# Patient Record
Sex: MALE | Race: WHITE | NOT HISPANIC OR LATINO | Employment: UNEMPLOYED | URBAN - METROPOLITAN AREA
[De-identification: names, ages, dates, MRNs, and addresses within clinical notes are randomized per-mention and may not be internally consistent; named-entity substitution may affect disease eponyms.]

---

## 2019-12-02 ENCOUNTER — TRANSFERRED RECORDS (OUTPATIENT)
Dept: HEALTH INFORMATION MANAGEMENT | Facility: CLINIC | Age: 39
End: 2019-12-02

## 2021-06-17 ENCOUNTER — TRANSFERRED RECORDS (OUTPATIENT)
Dept: HEALTH INFORMATION MANAGEMENT | Facility: CLINIC | Age: 41
End: 2021-06-17

## 2021-06-18 ENCOUNTER — MEDICAL CORRESPONDENCE (OUTPATIENT)
Dept: HEALTH INFORMATION MANAGEMENT | Facility: CLINIC | Age: 41
End: 2021-06-18

## 2021-06-22 ENCOUNTER — TELEPHONE (OUTPATIENT)
Dept: OTOLARYNGOLOGY | Facility: CLINIC | Age: 41
End: 2021-06-22
Payer: MEDICARE

## 2021-06-22 ENCOUNTER — TRANSCRIBE ORDERS (OUTPATIENT)
Dept: OTHER | Age: 41
End: 2021-06-22

## 2021-06-22 DIAGNOSIS — R06.83 SNORING: ICD-10-CM

## 2021-06-22 DIAGNOSIS — G47.33 OSA (OBSTRUCTIVE SLEEP APNEA): ICD-10-CM

## 2021-06-22 DIAGNOSIS — R53.83 FATIGUE: Primary | ICD-10-CM

## 2021-06-22 NOTE — TELEPHONE ENCOUNTER
[facility] Call [facility]    Phone Message    May a detailed message be left on voicemail: no     Reason for Call: Appointment Intake    Referring Provider Name: Dr. Everardo Warren Jr at [facility] to Dr. Dolly Dominguez re: possible Inspire to assist with management of moderate to severe RICHY.  Diagnosis and/or Symptoms: Fatigue [R53.83] RICHY (obstructive sleep apnea) [G47.33] Snoring [R06.83    Action Taken: Message routed to:  [facility]): CLINIC COORDINATORS-EYE-DENTAL-ENT- [[id number]] - per protocols    Travel Screening: Not Applicable

## 2021-06-22 NOTE — TELEPHONE ENCOUNTER
FUTURE VISIT INFORMATION      FUTURE VISIT INFORMATION:    Date: 7/23/2021    Time: 1PM    Location: [facility]  REFERRAL INFORMATION:    Referring provider:  Dr. Everardo Warren    Referring providers clinic: [facility]     Reason for visit/diagnosis  Inspire Device consult, referred by Everardo Warren at [facility], Sleep Study done 12/02/19, AHI 18.9    RECORDS REQUESTED FROM:       Clinic name Comments Records Status Imaging Status   [facility]  6/18/2021 referral   6/17/2021 note from Dr. Everardo Warren  12/2/2019 sleep study  Scanned in EPIC

## 2021-07-17 ENCOUNTER — HOSPITAL ENCOUNTER (OUTPATIENT)
Facility: CLINIC | Age: 41
Setting detail: OBSERVATION
Discharge: HOME | End: 2021-07-18
Attending: EMERGENCY MEDICINE | Admitting: EMERGENCY MEDICINE
Payer: MEDICARE

## 2021-07-17 DIAGNOSIS — R45.851 SUICIDAL IDEATION: ICD-10-CM

## 2021-07-17 DIAGNOSIS — F32.A DEPRESSION, UNSPECIFIED DEPRESSION TYPE: ICD-10-CM

## 2021-07-17 LAB — SARS-COV-2 RNA RESP QL NAA+PROBE: NEGATIVE

## 2021-07-17 PROCEDURE — G0378 HOSPITAL OBSERVATION PER HR: HCPCS

## 2021-07-17 PROCEDURE — 87635 SARS-COV-2 COVID-19 AMP PRB: CPT | Performed by: EMERGENCY MEDICINE

## 2021-07-17 PROCEDURE — C9803 HOPD COVID-19 SPEC COLLECT: HCPCS

## 2021-07-17 PROCEDURE — 90791 PSYCH DIAGNOSTIC EVALUATION: CPT

## 2021-07-17 PROCEDURE — 99285 EMERGENCY DEPT VISIT HI MDM: CPT | Mod: 25

## 2021-07-17 RX ORDER — ERGOCALCIFEROL 1.25 MG/1
50000 CAPSULE ORAL WEEKLY
COMMUNITY

## 2021-07-17 RX ORDER — NORTRIPTYLINE HCL 25 MG
25 CAPSULE ORAL AT BEDTIME
COMMUNITY

## 2021-07-17 RX ORDER — DEXMETHYLPHENIDATE HYDROCHLORIDE 10 MG/1
10 TABLET ORAL DAILY
COMMUNITY
End: 2021-12-25

## 2021-07-17 RX ORDER — ROSUVASTATIN CALCIUM 5 MG/1
5 TABLET, COATED ORAL AT BEDTIME
COMMUNITY

## 2021-07-17 RX ORDER — SUMATRIPTAN 100 MG/1
100 TABLET, FILM COATED ORAL
COMMUNITY

## 2021-07-17 ASSESSMENT — ACTIVITIES OF DAILY LIVING (ADL): DRESS: STREET CLOTHES

## 2021-07-17 ASSESSMENT — ENCOUNTER SYMPTOMS: NAUSEA: 1

## 2021-07-17 ASSESSMENT — MIFFLIN-ST. JEOR
SCORE: 1779.89
SCORE: 1779.38

## 2021-07-18 VITALS
WEIGHT: 194.89 LBS | DIASTOLIC BLOOD PRESSURE: 78 MMHG | BODY MASS INDEX: 28.87 KG/M2 | HEIGHT: 69 IN | SYSTOLIC BLOOD PRESSURE: 126 MMHG | OXYGEN SATURATION: 96 % | RESPIRATION RATE: 18 BRPM | HEART RATE: 61 BPM | TEMPERATURE: 98.1 F

## 2021-07-18 PROCEDURE — G0378 HOSPITAL OBSERVATION PER HR: HCPCS

## 2021-07-18 PROCEDURE — 99236 HOSP IP/OBS SAME DATE HI 85: CPT | Performed by: NURSE PRACTITIONER

## 2021-07-18 RX ORDER — ONDANSETRON 8 MG/1
8 TABLET, FILM COATED ORAL EVERY 8 HOURS PRN
Qty: 20 TABLET | Refills: 0 | Status: SHIPPED | OUTPATIENT
Start: 2021-07-18 | End: 2022-06-07

## 2021-07-18 NOTE — ED NOTES
Pt this morning is alert and oriented x 3, VSS and he met with the LMHP. Pt is calm and cooperative but does not want to go inpatient denies current SI.

## 2021-07-18 NOTE — PROGRESS NOTES
Patient agreed to discharge plan. Discharge instructions reviewed with patient including follow-up care plan. Medications: Zofran ordered to be picked up at patients Pharm[facility]. Reviewed safety plan and outpatient resources. Denies SI and HI. All belongings that were brought into the hospital have been returned to patient. Escorted off the unit at door 4 accompanied by [facility] staff. Discharged to home/selfcare via private transport by significant other.

## 2021-07-18 NOTE — ED NOTES
Writer jakob'd call from [facility]  reporting that they are sending the PT to the ED for further assessment.  She reports that this [place].  She states that he managed to bring himself safely home and CLYDE was contacted this afternoon.  He reported to her that his suicidal thoughts have reduced but are typically chronic present.  She reports that he has been having an increase in depression. She states that he has a therapist and psychiatrist but has been off of his medications for at least 6 months due to vomiting side effects.  He is supposed to be on Abilify and Trintilex.  She reports at least 5 previous hospitalizations with recommendations of ECT.  He has a hx of cd treatment with Coke and Meth use.  She states that he only reported chronic THC use.  She is having his girlfriend transported to the ED for further assessment and possible [facility] services.

## 2021-07-18 NOTE — PROGRESS NOTES
40 year old male with history of depression and anxiety received from ED due to suicidal ideation with plan [place] earlier today. Reports that he has chronic SI but it has been worth the last month with increased psychosocial and relationship stressors. Pt reports he has not taken his medications in 4-8 weeks. Endorses SI. Nursing and risk assessments completed. Assessments reviewed with LMHP and physician. Video monitoring in progress, patient informed.  Admission information reviewed with patient. Patient given a tour of [facility] and instructions on using the facility. Questions regarding [facility] addressed. Pt search completed and belongings inventoried.

## 2021-07-18 NOTE — DISCHARGE INSTRUCTIONS
[facility] Interventional Psychiatry Program    Website link:    [email or web address]      Telehealth: [facility] has video and phone visit options available  in addition to in-person visits. Please call them at [phone] and to schedule a visit. If it s possible to see your provider through a video visit, they will guide you through that process.  For many patients suffering from major depression, traditional medication or talk therapy is not effective in relieving symptoms. [facility]ventional Psychiat[facility] offers several procedures to help patients with treatmentresistant mood disorders.     If I am feeling unsafe or I am in a crisis, I will:  Contact my established care providers  Call the National Suicide Prevention Lifeline: [phone]  Go to the nearest emergency room  Call [phone]    Warning signs that I or other people might notice when a crisis is developing for me: increased suicidal thoughts, intent to act on the suicidal thoughts   Things I am able to do on my own to cope or help me feel better: meditation, hiking  Things that I am able to do with others to cope or help me better: try to stay connected to community and keep working on the great community projects that you have going  Things I can use or do for distraction: meditation, hiking  Changes I can make to support my mental health and wellness: start taking medications again, try to get connected the with alternative therapies program at the [facility], consider taking a MBSR class at the [facility] or [facility], consider going to SMART recovery meetings  People in my life that I can ask for help: my girlfriend, friends around the country  Your [facility] has a mental health crisis team you can call 24/7: [facility] Crisis Line Number: [phone]  Other things that are important when I m in crisis: try  to remember that you are working hard to improve yourself and your community. It's a long, difficult road, but things can change.   Additional resources and information:   -information printed out for you about MBSR classes and N interventional psychiatry program     Follow-Up Plans and Providers:   -Psychiatry med management appt scheduled for 8/10 with Mahnaz Gold  -[facility] will try to schedule a sooner med management appt for you and will reach out with info   -Follow up with Susan Adkins for therapy

## 2021-07-18 NOTE — ED PROVIDER NOTES
"  History   Chief Complaint:  Suicidal     The history is provided by the patient.      Srinivas Young is a 40 year old male with history of suicidal ideation, bipolar II disorder, depression, HTN and hyperlipidemia who presents with suicidal ideation. Srinivas reports that he has been inconsistently taking his medications for the last few months and in the last 6 weeks, he has completely stopped talking them due to nausea. He is prescribed all the medications listed below, but is currently taking none of them. In the last 6 weeks, he has had constant suicidal thoughts due to not taking his medications as well as stressors such as relationship issues. He has not acted on harming himself. He notes he is currently looking for a psychiatrist and has an upcoming appointment on August 10th, but is seeking help sooner than that.     Review of Systems   Gastrointestinal: Positive for nausea.   Psychiatric/Behavioral: Positive for suicidal ideas. Negative for self-injury.   All other systems reviewed and are negative.    Allergies:  No Known Allergies    Medications:  Abilify  Focalin  Monodox  Pamelor  Prilosec  Crestor  Imitrex  Zanaflex  Detrol LA  Calan-SR  Trintellix    Past Medical History:    Depression  Suicidal ideation  Bipolar II disorder   Substance abuse    Family History:    Depression    Social History:  Presents alone  Patient smokes marijuana     Physical Exam     Patient Vitals for the past 24 hrs:   BP Temp Temp src Pulse Resp SpO2 Height Weight   07/17/21 2141 132/89 98.9  F (37.2  C) Oral 75 18 95 % 1.753 m (5' 9\") 88.4 kg (194 lb 14.2 oz)   07/17/21 1947 (!) 147/90 (!) 96.5  F (35.8  C) Temporal 90 18 96 % 1.753 m (5' 9\") 88.5 kg (195 lb)     Physical Exam  General: Well-nourished,appears to be resting comfortably when I enter the room  Eyes: PERRL, conjunctivae pink no scleral icterus or conjunctival injection  ENT:  Moist mucus membranes, posterior oropharynx clear without erythema or " exudates  Respiratory:  Lungs clear to auscultation bilaterally, no crackles/rubs/wheezes.  Good air movement  CV: Normal rate and rhythm, no murmurs/rubs/gallops  GI:  Abdomen soft and non-distended.  Normoactive BS.  No tenderness, guarding or rebound  Skin: Warm, dry.  No rashes or petechiae  Musculoskeletal: No peripheral edema or calf tenderness  Neuro: Alert and oriented to person/place/time  Physical appearance, attire: Appears stated age. Hygiene well groomed. Eye contact at examiner. Speech regular, speech volume regular, speech quality fluid. Cognitive, perceptual reality based. Cognition, memory intact, judgment intact, insight intact. Orientation to time, place, person and situation. Thought logical. Hallucinations: None. General behavioral tone: Cooperative. Psychomotor activity: No problem noted. Gait: No problem. Mood depressed. Affect flat.    Emergency Department Course   Laboratory:  Asymptomatic Covid: Negative    Emergency Department Course:    Reviewed:  I reviewed nursing notes, vitals, past medical history and care everywhere    Assessments:  2000 I obtained history and examined the patient as noted above.     Disposition:  The patient was transferred to [facility].     Impression & Plan     Medical Decision Making:  Srinivas Young is a 40 year old male who presents for evaluation of worsening depression with suicidal ideation.  There are no concerns for or signs at this point of suicide attempt or ingestion, no concerning findings on the remainder of physical exam. The patient is medically cleared and deemed a good candidate for [facility] for further psychiatric evaluation and care. They were in agreement and are transferred to the [facility] unit in stable condition.    Covid-19  Srinivas Young was evaluated during a global COVID-19 pandemic, which necessitated consideration that the patient might be at risk for infection with the SARS-CoV-2 virus that causes COVID-19.   Applicable protocols for evaluation  were followed during the patient's care.   COVID-19 was considered as part of the patient's evaluation. The plan for testing is:  a test was obtained during this visit.    Diagnosis:    ICD-10-CM    1. Suicidal ideation  R45.851    2. Depression, unspecified depression type  F32.9      Scribe Disclosure:  Ronni ARREDONDO, am serving as a scribe at 7:54 PM on 7/17/2021 to document services personally performed by Jessica Elizondo MD based on my observations and the provider's statements to me.        Jessica Elizondo MD  07/18/21 0230

## 2021-07-18 NOTE — CONSULTS
"7/17/2021  Srinivas Young 1980     [facility] Mental Health Assessment:    Started at: 8:00am   Completed at: 8:35am  What type of assessment are you doing today? Crisis assessment    1.  Presenting Problem:      Referral Method to ED? Family/Friends     What brings the patient to the ED today? Srinivas is 40 year old male and uses he/him pronouns. Srinivas presented to [facility] for worsening depression with chronic SI. Srinivas stated that he has not taken MH medications for 4-6 weeks due to feeling that they weren't working and were making him nauseous. Per collateral report from [place], collected by Vonnie [facility], \"Writer rec'd call from [facility]  reporting that they are sending the PT to the ED for further assessment.  She reports that this AM the [place].  She states that he managed to bring himself safely home and [facility] was contacted this afternoon.\"   Srinivas reports that he feels fatigued and hopeless with poor appetite and is worried about how his MH is impacting his relationship with his girlfriend. Srinivas reported a hx of 5 psych hospitalizations in the past, and ECT has been recommended to him. Srinivas reported that he is open to learning more about ECT and is interested in alternative treatments at the [facility]AL [facility].   Srinivas reports issues with heartburn, sleep apnea and chronic migraines. Srinivas reported that his sleep is okay other than the severe sleep apnea (uses a cpap machine).   Srinivas reported a hx of coke and meth use, no recent use. He stated that he had been using cannabis daily, but quit 2 weeks ago. Srinivas reported that he is interested in starting SMART Recovery meetings (as an alternative to AA).   Srinivas reported chronic SI for about the last 6 weeks, but no hx of attempts, other than contemplating [place] 7/17. Srinivas does not have access to firearms. When asked how Srinivas has coped " and managed his chronic SI, Srinivas spoke extensively about the community-based projects and work that he is involved in. Srinivas is looking forward to continuing to work on these projects. Srinivas also spoke about his young daughter and wanting to be able to provide for her.   Srinivas reported that if SI increases in intensity again he is able to reach out to his girlfriend, call [facility] or 911, or bring himself to the ED.     Has this happened before? Yes depression for decades, hx of 5 hospitalizations     Duration of presenting problem: managing depression for decades, not taking medications for the last 4-6 weeks    Additional Stressors: n/a    2.  Risk Assessment:  Suicide and Self-Harm    ESS-6  1.a. Over the past 2 weeks, have you had thoughts of killing yourself? Yes   1.b. Have you ever attempted to kill yourself and, if yes, when did this last happen? No  2. Recent or current suicide plan? Yes  3. Recent or current intent to act on ideation? No  4. Lifetime psychiatric hospitalization? Yes  5. Pattern of excessive substance use? No  6. Current irritability, agitation, or aggression? No  ESS-6 Score: 3    SI: Active  Plan: Yes yesterday morning was standing at [place] contemplating jumping off. Did not attempt to jump off.   Intent: No   Prior Attempts: No     Protective Factors: obligation to family, particularly young daughter, future-focused thinking, no access to firearms. Srinivas spoke extensively of the community work that he is doing and future projects that he is looking forward to.     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 Going to the gym, spending time in the sun, spending time with his daughter. Srinivas reported that he has not taken any MH meds in 4-6 weeks due to them not working well.     Additional Risk Factors  Related to Safety and Suicide: No    Is the patient engaged in self injurious behaviors? No     Risk to Others    Aggressive/Assaultive/Homicidal Risk Factors: No     Duty to Warn? No     Was a Child Protection Report Made? No       Was a Adult Protection Report Made? No        Sexually inappropriate behavior? No        Vulnerability to sexual exploitation? No     Additional information: n/a      3. Mental Health Symptoms and Substance Use  Current Symptoms and Mental Health History    GAIN Short Screener (GAIN-SS) administered? NA    Attention, Hyperactivity, and Impulsivity Symptoms      Patient reported symptoms related to hyperactivity, inattention, or impulsivity? No     Anxiety Symptoms    Patient reported anxiety symptoms? No       Behavioral Difficulties    Patient reported behavioral difficulties? No     Mood Symptoms    Patient reported mood disorder symptoms? Yes: Appetite change/weight change , Feelings of helplessness , Feelings of hopelessness , Feelings of worthlessness , Loss of interest / Anhedonia , Sad, depressed mood  and Thoughts of suicide/death      Eating Disorders and Appetite Disturbance      Patient reported appetite symptoms? No     Interpersonal Functioning     Patient reported difficulties that may be associated with personality and interpersonal functioning? No    Learning Disabilities/Cognitive/Developmental Disorders    Patient reported concerns related to learning disabilities, cognitive challenges, and/or developmental disorders? No     General Cognitive Impairments    Patient reported symptoms of cognitive impairments? No  If yes, complete Mini-Cog Assessment.    Sleep Disturbance    Patient reported difficulties with sleep? Yes: Other: Srinivas reported severe sleep apnea, but otherwise sleep is okay.       Psychosis Symptoms    Patient reported symptoms of psychosis? No      Trauma and Post-Traumatic Stress Disorder    Physical Abuse: No   Emotional/Psychological Abuse: No  Sexual  Abuse: No  Loss of a friend or family member to suicide: No  Other Traumatic Event: No     Patient reported trauma related symptoms? No     Impact of Mental Health on Functioning      Negative Impact Score: 9/10   Subjective Impact on functioning: MH impacting his relationship, feels fatigued and that meds are not helping  How do symptoms vary from baseline? Worsening depression     Current and Historical Substance Use Note:    IIs there a history of, or current, substance use? Yes, hx of cocaine and meth use - not currently using. Srinivas reported that he was regularly using cannabis, but quit 2 weeks ago.     Have you been to chemical dependency treatment or detox before? Yes: hx of treatment. Srinivas reported that he is thinking about going to [facility] recovery meetings.      CAGE-AID    Have you felt you ought to cut down on your drinking or drug use? Yes     Have people annoyed you by criticizing your drinking or drug use? Yes   Have you felt bad or guilty about your drinking or drug use? Yes  Have you ever had a drink or used drugs first thing in the morning to steady your nerves or to get rid of a hangover? No   CAGE-AID Score: 3/4    Drug screen completed? No   BAL/Breathalyzer completed? No       Mental Status Exam:    Affect: Flat  Appearance: Appropriate   Attention Span/Concentration: Attentive    Eye Contact: Engaged  Fund of Knowledge: Appropriate   Language /Speech Content: Fluent  Language /Speech Volume: Normal   Language /Speech Rate/Productions: Normal   Recent Memory: Intact  Remote Memory: Intact  Mood: Depressed   Orientation:   Person: Yes   Place: Yes  Time of Day: Yes   Date: Yes   Situation (Do they understand why they are here?): Yes   Psychomotor Behavior: Normal   Thought Content: Clear  Thought Form: Intact    4. Social and Environmental Conditions   Is the patient their own guardian? Yes    Living Situation: With others: with girlfriend, 10 year old step-son, and infant daughter    Support  system and quality of connections: girlfriend Sierra is supportive, but there is relationship conflict. Friends out of state, minimal support network in [place]     Income source: Employment: community-based work    Issues with employment or education: No    Legal Concerns  Do you have any history of or current involvement with the legal system? No    Spiritual and Cultural Influences  Do you have any Orthodoxy beliefs that are important in your life? No     Do you have any cultural influences in your life that impact your mental health care? No        5. Psychiatric History, Medical History, and Current Care      Patient Mental Health Services   Does the patient have a history of mental health concerns/diagnoses? Yes depression     Current Providers  Primary Care Provider: Yes Jessy Rice at [facility]   Psychiatrist: No   Therapist: Yes Susan Adkins with [facility]   : No   [facility]: No   ACT Team: No   Other: No    History of Commitment? No  History of Psychiatric Hospitalizations? Yes 5 inpt stays in the past   History of programmatic care? No    Family Mental Health History   Family History of Mental Health or Chemical Dependency Issues? Unable to assess    Development and Physical Health Challenges  Delays or concerns meeting developmental milestones? No  Current psychotropic medications? No   Medication Compliant? NA - is currently prescribed meds, but has not taken them in 4-6 weeks  Recent medication changes? NA    History of concussion or TBI? No     Additional Information: n/a    6. Collateral Information and Collaboration    Collaboration with medical staff:Referral Information:   Medical Records, Psychiatry and Nursing     Collateral Information/Sources: Family: girlfriend, Sierra and Community Provider: CLYDE called [facility] to give report    7. Assessment and Diagnosis  Assessment of patient strengths and vulnerabilities    Strengths, Protective Factors, & Community Resources: actively sought  help from family and providers, connected with individual and couples therapists     Patient skills, abilities, and coping skills (what is going well?): Srinivas is well connected to and actively involved in his community    Patient vulnerabilities: treatment resistant depression, relationship stressors    Diagnosis (F32.9) depressive disorder, unspecified     8.Therapeutic Methodologies Utilized in Assessment    Psychotherapy techniques and/or interventions used: Establishing rapport, Active listening, Motivational Interviewing, Brief Supportive Therapy and Safety planning    9. Patient Care/Treatment Plan  Summary of Patient Presentation and needs  What are the basic needs for this patient in this moment? Medication consultation with attending provider, additional resources for therapies for depression     Consultations :  Attending provider consulted? Yes  Attending Name: JAZLYN Stroud, ETHEL   Attending concurs with disposition? Yes     Recommended disposition: Individual Therapy and Medication Management     Does the patient agree with the recommended level of care? Yes    Final disposition: Individual therapy  and Medication management    Disposition Details: Srinivas will follow up with Mahnaz Gold on 8/10 for med management. [facility] also requested an appt from [facility] to try get Srinivas access to a med management provider sooner. [facility] coordinator to follow up. Srinivas will continue with established therapist Susan Adkins at [facility]. Information was provided to Srinivas about how to access the [facility] interventional psychiatry program and mindfulness-based stress reduction classes.     If Inpatient, is patient admitted voluntary? N/A   Patient aware of potential for transfer if there is not appropriate placement? NA  Patient is willing to travel outside of the [facility] for placement? NA   Central Intake Notified? NA    10. Patient Care Document: Safety and After Care Planning:          Safety Plan Provided? Yes:   If I am feeling  unsafe or I am in a crisis, I will:  Contact my established care providers  Call the National Suicide Prevention Lifeline: [phone]  Go to the nearest emergency room  Call [phone]    Warning signs that I or other people might notice when a crisis is developing for me: increased suicidal thoughts, intent to act on the suicidal thoughts   Things I am able to do on my own to cope or help me feel better: meditation, hiking  Things that I am able to do with others to cope or help me better: try to stay connected to community and keep working on the great community projects that you have going  Things I can use or do for distraction: meditation, hiking  Changes I can make to support my mental health and wellness: start taking medications again, try to get connected the with alternative therapies program at the [facility], consider taking a MBSR class at the [facility] or [facility], consider going to SMART recovery meetings  People in my life that I can ask for help: my girlfriend, friends around the country  Your [facility] has a mental health crisis team you can call 24/7: [facility] Crisis Line Number: [phone]  Other things that are important when I m in crisis: try to remember that you are working hard to improve yourself and your community. It's a long, difficult road, but things can change.   Additional resources and information:   -information printed out for you about MBSR classes and [facility] interventional psychiatry program     Follow-Up Plans and Providers:   -Psychiatry med management appt scheduled for 8/10 with Mahnaz Gold  -[facility] will try to schedule a sooner med management appt for you and will reach out with info   -Follow up with Susan Adkins for therapy     Follow-Up Plan:  After care plan provided to the patient/guardian by: ARSALAN Paz  After care plan provided to any additional sources/parties? No    Duration of face to face time with patient in minutes: 1.0 hrs    CPT code(s)  utilized: 03729 - Psychotherapy for Crisis - 60 (30-74*) min      ARSALAN Paz

## 2021-07-18 NOTE — ED PROVIDER NOTES
[facility] Unit - Psychiatry  Combined Observation Note and Discharge Summary  [facility] Emergency Department  Observation Initiation Date: Jul 17, 2021    Srinivas Young MRN: [id number]   Age: 40 year old YOB: 1980     History     Chief Complaint   Patient presents with     Suicidal     HPI  Srinivas Young is a 40 year old male with a past history notable for bipolar II disorder, depression, HTN and hyperlipidemia who presents with suicidal ideation. Reports that he has been inconsistently taking his medications for the last few months and in the last 6 weeks, he has completely stopped talking them due to nausea and inefficacy. Of note, the nausea/gagging has not improved off medication.  Reports he is working with Mahnaz Gold CNP with [facility].  In addition, Reports he has an appointment within [facility] with psychiatry in September.  His provider has broached the subject of ECT as he has a long history of treatment resistant depression.  In the last 6 weeks, off the medication, Reports increase in depression and chronic suicidal ideation is more intense.  Endorses psychosocial stressors including relationship uihe has had constant suicidal thoughts due to not taking his medications as well as stressors such as relationship issues. He has not acted on harming himself. He notes he is currently looking for a psychiatrist and has an upcoming appointment on August 10th, but is seeking help sooner than that.     Past Medical History  Past Medical History:   Diagnosis Date     Depression      Prolonged Q-T interval on ECG      Past Surgical History:   Procedure Laterality Date     no surgical history       dexmethylphenidate (FOCALIN) 10 MG tablet  ergocalciferol (ERGOCALCIFEROL) 1.25 MG (79697 UT) capsule  nortriptyline (PAMELOR) 25 MG capsule  ondansetron (ZOFRAN) 8 MG tablet  riboflavin (VITAMIN  B2) 25 MG TABS  rosuvastatin (CRESTOR) 5 MG tablet  SUMAtriptan (IMITREX)  "100 MG tablet      No Known Allergies  Family History  Family History   Problem Relation Age of Onset     Depression Mother      Depression Father      Depression Paternal Grandmother      Arrhythmia Brother         prolonged QT     Arrhythmia Sister         prolonged QT     Social History   Social History     Tobacco Use     Smoking status: Never Smoker   Substance Use Topics     Alcohol use: Yes     Comment: daily- 1 or 2      Drug use: Yes     Types: Marijuana      Past medical history, past surgical history, medications, allergies, family history, and social history were reviewed with the patient. No additional pertinent items.       Review of Systems  A complete review of systems was performed with pertinent positives and negatives noted in the HPI, and all other systems negative.    Physical Examination   BP: (!) 147/90  Pulse: 90  Temp: (!) 96.5  F (35.8  C)  Resp: 18  Height: 175.3 cm (5' 9\")  Weight: 88.5 kg (195 lb)  SpO2: 96 %    Physical Exam  General: Appears stated age.   Neuro: Alert and fully oriented. Extremities appear to demonstrate normal strength on visual inspection.   Integumentary/Skin: no rash visualized, normal color    Psychiatric Examination   Appearance: awake, alert and dressed in hospital scrubs  Attitude:  cooperative  Eye Contact:  fair  Mood:  depressed  Affect:  appropriate and in normal range  Speech:  clear, coherent  Psychomotor Behavior:  no evidence of tardive dyskinesia, dystonia, or tics  Throught Process:  logical and goal oriented  Associations:  no loose associations  Thought Content:  chronic suicidal thoughts, no plan or intent  Insight:  fair  Judgement:  intact  Oriented to:  time, person, and place  Attention Span and Concentration:  intact  Recent and Remote Memory:  intact  Language: able to name/identify objects without impairment  Fund of Knowledge: intact with awareness of current and past events    ED Course        Labs Ordered and Resulted from Time of ED " Arrival Up to the Time of Departure from the ED   SARS-COV2 (COVID-19) VIRUS RT-PCR - Normal    Narrative:     Testing was performed using the patricia  SARS-CoV-2 & Influenza A/B Assay on the patricia  [place]  System.  This test should be ordered for the detection of SARS-COV-2 in individuals who meet SARS-CoV-2 clinical and/or epidemiological criteria. Test performance is unknown in asymptomatic patients.  This test is for in vitro diagnostic use under the FDA EUA for laboratories certified under CLIA to perform moderate and/or high complexity testing. This test has not been FDA cleared or approved.  A negative test does not rule out the presence of PCR inhibitors in the specimen or target RNA in concentration below the limit of detection for the assay. The possibility of a false negative should be considered if the patient's recent exposure or clinical presentation suggests COVID-19.  [facility] are certified under the Clinical Laboratory Improvement Amendments of 1988 (CLIA-88) as qualified to perform moderate and/or high complexity laboratory testing.   COVID-19 VIRUS (CORONAVIRUS) BY PCR    Narrative:     The following orders were created for panel order Asymptomatic COVID-19 Virus (Coronavirus) by PCR Nasopharyngeal.  Procedure                               Abnormality         Status                     ---------                               -----------         ------                     SARS-COV2 (COVID-19) Vir...[[id number]]  Normal              Final result                 Please view results for these tests on the individual orders.       Assessments & Plan (with Medical Decision Making)   Patient presenting with . Nursing notes reviewed noting no acute issues.     I have reviewed the assessment completed by the [facility].     During the observation period, the patient did not require medications for agitation, and did not require restraints/seclusion for patient and/or provider safety.    The  patient was found to have a psychiatric condition that would benefit from an observation stay in the emergency department for further psychiatric stabilization and/or coordination of a safe disposition. The observation plan includes serial assessments of psychiatric condition, potential administration of medications if indicated, further disposition pending the patient's psychiatric course during the monitoring period.     After the period in observation care, the patient's circumstances and mental state were safe for outpatient management. After counseling on the diagnosis, work-up, and treatment plan, the patient was discharged. Close follow-up with a psychiatrist and/or therapist was recommended and community psychiatric resources were provided. Patient is to return to the ED if any urgent or potentially life-threatening concerns.      At the time of discharge, the patient's acute suicide risk was determined to be low due to the following factors: Reduction in the intensity of mood/anxiety symptoms that preceded the admission, denial of suicidal thoughts, denies feeling helpless or helpless, not currently under the influence of alcohol or illicit substances, denies experiencing command hallucinations, no immediate access to firearms. The patient's acute risk could be higher if noncompliant with their treatment plan, medications, follow-up appointments or using illicit substances or alcohol.     Preliminary diagnosis:    ICD-10-CM    1. Suicidal ideation  R45.851    2. Depression, unspecified depression type  F32.9         Treatment Plan:   -Discharged to home,   -Restart medications (he has all at home) and will add Zofran targeting the nausea he is experiencing. Would recommend starting the Abilify at 5 mg for four days then increase to 10 mg (hsitorical dose).    Information provided on the [facility] Interventional Psychiatry Program    Website link:     [email or web address]    --  JAZLYN Pérez CNP   [facility] EMERGENCY DEPT  [facility] Unit  7/17/2021         Sandrine Abreu APRN CNP  07/18/21 [id number]

## 2021-07-18 NOTE — ED TRIAGE NOTES
Suicidal, depression, unable to take psych meds x6wks due to nausea/vomiting. Called [facility] line and advised to come to ED.

## 2021-07-18 NOTE — ED NOTES
Brought patient to [facility] from ED at 9:45 pm. Patient tired and declined assessment until morning.

## 2021-07-19 ENCOUNTER — TELEPHONE (OUTPATIENT)
Dept: PSYCHIATRY | Facility: CLINIC | Age: 41
End: 2021-07-19

## 2021-07-19 DIAGNOSIS — Z71.89 OTHER SPECIFIED COUNSELING: ICD-10-CM

## 2021-07-19 NOTE — TELEPHONE ENCOUNTER
New Refer to [facility], Referred from ED stay    Received Referral from Jessy Rice MD at [facility].    DX:  Anxiety          Mild Episode of recurrent major                  depressive disorder    Call patient to set up appointment  [phone]

## 2021-07-23 ENCOUNTER — VIRTUAL VISIT (OUTPATIENT)
Dept: OTOLARYNGOLOGY | Facility: CLINIC | Age: 41
End: 2021-07-23
Attending: PSYCHIATRY & NEUROLOGY
Payer: MEDICARE

## 2021-07-23 ENCOUNTER — PRE VISIT (OUTPATIENT)
Dept: OTOLARYNGOLOGY | Facility: CLINIC | Age: 41
End: 2021-07-23

## 2021-07-23 ENCOUNTER — MEDICAL CORRESPONDENCE (OUTPATIENT)
Dept: HEALTH INFORMATION MANAGEMENT | Facility: CLINIC | Age: 41
End: 2021-07-23

## 2021-07-23 VITALS — BODY MASS INDEX: 29.03 KG/M2 | HEIGHT: 69 IN | WEIGHT: 196 LBS

## 2021-07-23 DIAGNOSIS — G47.33 OSA (OBSTRUCTIVE SLEEP APNEA): Primary | ICD-10-CM

## 2021-07-23 PROCEDURE — 99442 PR PHYSICIAN TELEPHONE EVALUATION 11-20 MIN: CPT | Mod: 95 | Performed by: OTOLARYNGOLOGY

## 2021-07-23 RX ORDER — OXYMETAZOLINE HYDROCHLORIDE 0.05 G/100ML
2 SPRAY NASAL ONCE
Status: CANCELLED | OUTPATIENT
Start: 2021-07-23 | End: 2021-07-23

## 2021-07-23 RX ORDER — GLYCOPYRROLATE 0.2 MG/ML
0.2 INJECTION, SOLUTION INTRAMUSCULAR; INTRAVENOUS ONCE
Status: CANCELLED | OUTPATIENT
Start: 2021-07-23 | End: 2021-07-23

## 2021-07-23 ASSESSMENT — PAIN SCALES - GENERAL: PAINLEVEL: NO PAIN (0)

## 2021-07-23 ASSESSMENT — MIFFLIN-ST. JEOR: SCORE: 1784.43

## 2021-07-23 NOTE — PROGRESS NOTES
Srinivas is a 40 year old who is being evaluated via a billable telephone visit.      Phone call duration: 12 minutes        SLEEP SURGERY CONSULTATION    Patient: Srinivas Young  : 1980  CHIEF COMPLAINT: RICHY    IDENTIFICATION: Dr. Warren consulted Dr. Dominguez for surgical evaluation and possible treatment of obstructive sleep apnea syndrome for Srinivas Young.    HPI:  Srinivas Young is a 40 year old year old male who has Obstructive Sleep Apnea.  Patient has a sleep study performed on 2019 through the [facility] sleep center.  This was a split-night sleep study.  Overall AHI was 18.9.  Lowest oxygen saturation was 83%.  Nonsupine AHI was 1.7.  Supine AHI was 40.  Patient does like to sleep on his back.  He had 30 obstructive apneas, 12 hypopneas, 0 central apneas, 0 mixed apneas.  Patient has been using CPAP since his study.  He does use CPAP on a nightly basis and finds it to be helpful.  However he also finds CPAP to be extremely inconvenient as he does not like to go camping and traveling to places that do not have electricity where he cannot use his CPAP.  Those times where he is not able to use his CPAP he finds it to be very scary because he will wake up gasping for breath and even having nightmares about being choked.  He is interested in learning more about hypoglossal nerve stimulation therapy.  Patient's past medical history is significant for depression.    PAST MEDICAL HISTORY:  Past Medical History:   Diagnosis Date     Depression      Prolonged Q-T interval on ECG        PAST SURGICAL HISTORY:  Past Surgical History:   Procedure Laterality Date     no surgical history         MEDICATIONS:  Current Outpatient Medications   Medication Sig Dispense Refill     dexmethylphenidate (FOCALIN) 10 MG tablet Take 10 mg by mouth daily       ergocalciferol (ERGOCALCIFEROL) 1.25 MG (64370 UT) capsule Take 50,000 Units by mouth once a week       nortriptyline (PAMELOR) 25 MG capsule Take 25 mg by mouth At  Bedtime       ondansetron (ZOFRAN) 8 MG tablet Take 1 tablet (8 mg) by mouth every 8 hours as needed for nausea 20 tablet 0     riboflavin (VITAMIN  B2) 25 MG TABS Take 50 mg by mouth daily       rosuvastatin (CRESTOR) 5 MG tablet Take 5 mg by mouth daily       SUMAtriptan (IMITREX) 100 MG tablet Take 100 mg by mouth at onset of headache for migraine         ALLERGIES:  No Known Allergies    SOCIAL HISTORY:  Social History     Socioeconomic History     Marital status: Single     Spouse name: Not on file     Number of children: Not on file     Years of education: Not on file     Highest education level: Not on file   Occupational History     Not on file   Tobacco Use     Smoking status: Never Smoker   Substance and Sexual Activity     Alcohol use: Yes     Comment: daily- 1 or 2      Drug use: Yes     Types: Marijuana     Sexual activity: Not on file   Other Topics Concern     Not on file   Social History Narrative     Not on file     Social Determinants of Health     Financial Resource Strain:      Difficulty of Paying Living Expenses:    Food Insecurity:      Worried About Running Out of Food in the Last Year:      Ran Out of Food in the Last Year:    Transportation Needs:      Lack of Transportation (Medical):      Lack of Transportation (Non-Medical):    Physical Activity:      Days of Exercise per Week:      Minutes of Exercise per Session:    Stress:      Feeling of Stress :    Social Connections:      Frequency of Communication with Friends and Family:      Frequency of Social Gatherings with Friends and Family:      Attends [employer] Services:      Active Member of Clubs or Organizations:      Attends Club or Organization Meetings:      Marital Status:    Intimate Partner Violence:      Fear of Current or Ex-Partner:      Emotionally Abused:      Physically Abused:      Sexually Abused:        FAMILY HISTORY:  Family History   Problem Relation Age of Onset     Depression Mother      Depression Father       "Depression Paternal Grandmother      Arrhythmia Brother         prolonged QT     Arrhythmia Sister         prolonged QT       REVIEW OF SYSTEMS:  No flowsheet data found.      PHYSICAL EXAM  Ht 1.753 m (5' 9\")   Wt 88.9 kg (196 lb)   BMI 28.94 kg/m      ASSESSMENT:  1.  Moderate obstructive sleep apnea,        PLAN:  I discussed with the patient held upper airway stimulation therapy works. We reviewed expected outcomes as well as MRI incompatibility restrictions at this time.  We discussed what is involved in the surgery as well as as expected recovery.  Discussed with the patient the risk of nonresponse rate as well as potential discomfort from the device itself while stimulating the tongue. We then reviewed the selection criteria.    We will next proceed with DISE.  If he is not a candidate for Inspire, we will use the DISE to determine if he is candidate for other surgery      I spent a total of 30 minutes total time towards today's visit.  Time spent included seeing and evaluating Srinivas Young during today's office visit, which included counseling the patient.  Time was also spent reviewing records/tests;  and documenting clinical information in the electronic health record.            "

## 2021-07-23 NOTE — PROGRESS NOTES
Called patient and reviewed surgical teaching with patient and expectations. Answered questions where able. Patient advised that scheduling would call him next week to get him set up for surgery.    Patient was advised he would receive the surgical soap in the mail as well as a packet of information to review before surgery. Patient was advised if he has any questions to give our clinic a phone call.    Patient verbalized understanding of all instructions and is in agreement with the plan.    FABI GLYNN LPN on 7/23/2021 at 1:47 PM

## 2021-07-27 ENCOUNTER — TELEPHONE (OUTPATIENT)
Dept: OTOLARYNGOLOGY | Facility: CLINIC | Age: 41
End: 2021-07-27

## 2021-07-27 DIAGNOSIS — Z11.59 ENCOUNTER FOR SCREENING FOR OTHER VIRAL DISEASES: ICD-10-CM

## 2021-07-27 PROBLEM — G47.33 OSA (OBSTRUCTIVE SLEEP APNEA): Status: ACTIVE | Noted: 2021-07-27

## 2021-07-27 NOTE — TELEPHONE ENCOUNTER
Talked to patient but he was in a Zoom call he would like writer to call back after 2pm.     Zully Sousa on 7/27/2021 at 1:35 PM

## 2021-07-28 NOTE — TELEPHONE ENCOUNTER
Called patient back to schedule surgery    Surgeon: Dr. Dominguez  Date of Surgery: 08/25/2021  Location of surgery: [facility]  Pre-Op H&P: PCP   Post-Op Appt Date: 1 week    Imaging needed:  No  Discussed COVID-19 testing:  Yes  Pre-cert/Authorization completed:  No  Packet sent out: Yes 07/28/21    Discussed approximate arrival time. 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 Informed patient that writer is really not sure what Dr. Dominguez's OR time will be on 8/25 as things do change. Pt was provided number to contact for scheduling his covid-19 test or we will call him. No further questions.

## 2021-08-09 ENCOUNTER — VIRTUAL VISIT (OUTPATIENT)
Dept: PSYCHIATRY | Facility: CLINIC | Age: 41
End: 2021-08-09
Payer: MEDICARE

## 2021-08-09 DIAGNOSIS — F32.2 CURRENT SEVERE EPISODE OF MAJOR DEPRESSIVE DISORDER WITHOUT PSYCHOTIC FEATURES WITHOUT PRIOR EPISODE (H): Primary | ICD-10-CM
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
## 2021-08-24 ENCOUNTER — ANESTHESIA EVENT (OUTPATIENT)
Dept: SURGERY | Facility: AMBULATORY SURGERY CENTER | Age: 41
End: 2021-08-24
Payer: MEDICARE

## 2021-08-24 NOTE — ANESTHESIA PREPROCEDURE EVALUATION
Anesthesia Pre-Procedure Evaluation    Patient: Srinivas Young   MRN: [id number] : 1980        Preoperative Diagnosis: RICHY (obstructive sleep apnea) [G47.33]   Procedure : Procedure(s):  DRUG-INDUCED SLEEP ENDOSCOPY     Past Medical History:   Diagnosis Date     Depression      Prolonged Q-T interval on ECG       Past Surgical History:   Procedure Laterality Date     no surgical history        No Known Allergies   Social History     Tobacco Use     Smoking status: Never Smoker     Smokeless tobacco: Never Used   Substance Use Topics     Alcohol use: Yes     Comment: daily- 1 or 2       Wt Readings from Last 1 Encounters:   21 88.9 kg (196 lb)           Physical Exam    Airway        Mallampati: II   TM distance: > 3 FB   Neck ROM: full   Mouth opening: > 3 cm    Respiratory Devices and Support         Dental           Cardiovascular             Pulmonary                   OUTSIDE LABS:  CBC:   Lab Results   Component Value Date    WBC 9.6 10/21/2015    HGB 14.1 10/21/2015    HCT 42.2 10/21/2015     10/21/2015     BMP:   Lab Results   Component Value Date     10/21/2015    POTASSIUM 4.1 10/21/2015    CHLORIDE 107 10/21/2015    CO2 26 10/21/2015    BUN 14 10/21/2015    CR 0.98 10/21/2015    GLC 91 10/21/2015     COAGS: No results found for: PTT, INR, FIBR  POC: No results found for: BGM, HCG, HCGS  HEPATIC:   Lab Results   Component Value Date    ALBUMIN 3.8 10/21/2015    PROTTOTAL 6.9 10/21/2015     (H) 10/21/2015    AST 45 10/21/2015    ALKPHOS 88 10/21/2015    BILITOTAL 0.4 10/21/2015     OTHER:   Lab Results   Component Value Date    SHERYL 8.8 10/21/2015    TSH 2.72 10/21/2015       Anesthesia Plan    ASA Status:  2   NPO Status:  NPO Appropriate    Anesthesia Type: MAC.     - Reason for MAC: straight local not clinically adequate   Induction: Intravenous, Propofol.   Maintenance: TIVA.        Consents    Anesthesia Plan(s) and associated risks, benefits, and realistic alternatives  discussed. Questions answered and patient/representative(s) expressed understanding.     - Discussed with:  Patient      - Extended Intubation/Ventilatory Support Discussed: No.      - Patient is DNR/DNI Status: No    Use of blood products discussed: No .     Postoperative Care    Pain management: Multi-modal analgesia.   PONV prophylaxis: Ondansetron (or other 5HT-3)     Comments:    Patient has migraine. Ok with imitrex     H&P reviewed: Unable to attach H&P to encounter due to EHR limitations. H&P Update: appropriate H&P reviewed, patient examined. No interval changes since H&P (within 30 days).         Kin Schmidt MD

## 2021-08-25 ENCOUNTER — ANESTHESIA (OUTPATIENT)
Dept: SURGERY | Facility: AMBULATORY SURGERY CENTER | Age: 41
End: 2021-08-25
Payer: MEDICARE

## 2021-08-25 ENCOUNTER — HOSPITAL ENCOUNTER (OUTPATIENT)
Facility: AMBULATORY SURGERY CENTER | Age: 41
End: 2021-08-25
Attending: OTOLARYNGOLOGY
Payer: MEDICARE

## 2021-08-25 VITALS
TEMPERATURE: 97.9 F | OXYGEN SATURATION: 99 % | HEART RATE: 68 BPM | RESPIRATION RATE: 16 BRPM | WEIGHT: 196 LBS | HEIGHT: 69 IN | BODY MASS INDEX: 29.03 KG/M2 | DIASTOLIC BLOOD PRESSURE: 84 MMHG | SYSTOLIC BLOOD PRESSURE: 122 MMHG

## 2021-08-25 DIAGNOSIS — G47.33 OSA (OBSTRUCTIVE SLEEP APNEA): ICD-10-CM

## 2021-08-25 PROCEDURE — 31575 DIAGNOSTIC LARYNGOSCOPY: CPT

## 2021-08-25 PROCEDURE — 31575 DIAGNOSTIC LARYNGOSCOPY: CPT | Performed by: OTOLARYNGOLOGY

## 2021-08-25 RX ORDER — OXYCODONE HYDROCHLORIDE 5 MG/1
5-10 TABLET ORAL EVERY 4 HOURS PRN
Status: DISCONTINUED | OUTPATIENT
Start: 2021-08-25 | End: 2021-08-26 | Stop reason: HOSPADM

## 2021-08-25 RX ORDER — PROPOFOL 10 MG/ML
INJECTION, EMULSION INTRAVENOUS PRN
Status: DISCONTINUED | OUTPATIENT
Start: 2021-08-25 | End: 2021-08-25

## 2021-08-25 RX ORDER — HYDROMORPHONE HYDROCHLORIDE 1 MG/ML
0.2 INJECTION, SOLUTION INTRAMUSCULAR; INTRAVENOUS; SUBCUTANEOUS EVERY 5 MIN PRN
Status: DISCONTINUED | OUTPATIENT
Start: 2021-08-25 | End: 2021-08-25 | Stop reason: HOSPADM

## 2021-08-25 RX ORDER — ONDANSETRON 4 MG/1
4 TABLET, ORALLY DISINTEGRATING ORAL EVERY 30 MIN PRN
Status: DISCONTINUED | OUTPATIENT
Start: 2021-08-25 | End: 2021-08-26 | Stop reason: HOSPADM

## 2021-08-25 RX ORDER — PROPOFOL 10 MG/ML
INJECTION, EMULSION INTRAVENOUS CONTINUOUS PRN
Status: DISCONTINUED | OUTPATIENT
Start: 2021-08-25 | End: 2021-08-25

## 2021-08-25 RX ORDER — ARIPIPRAZOLE 10 MG/1
10 TABLET ORAL DAILY
COMMUNITY

## 2021-08-25 RX ORDER — LABETALOL HYDROCHLORIDE 5 MG/ML
10 INJECTION, SOLUTION INTRAVENOUS
Status: DISCONTINUED | OUTPATIENT
Start: 2021-08-25 | End: 2021-08-25 | Stop reason: HOSPADM

## 2021-08-25 RX ORDER — LIDOCAINE 40 MG/G
CREAM TOPICAL
Status: DISCONTINUED | OUTPATIENT
Start: 2021-08-25 | End: 2021-08-25 | Stop reason: HOSPADM

## 2021-08-25 RX ORDER — MEPERIDINE HYDROCHLORIDE 25 MG/ML
12.5 INJECTION INTRAMUSCULAR; INTRAVENOUS; SUBCUTANEOUS
Status: DISCONTINUED | OUTPATIENT
Start: 2021-08-25 | End: 2021-08-26 | Stop reason: HOSPADM

## 2021-08-25 RX ORDER — OMEPRAZOLE 40 MG/1
40 CAPSULE, DELAYED RELEASE ORAL DAILY
COMMUNITY
Start: 2021-07-12

## 2021-08-25 RX ORDER — VERAPAMIL HYDROCHLORIDE 120 MG/1
120 CAPSULE, EXTENDED RELEASE ORAL DAILY
COMMUNITY

## 2021-08-25 RX ORDER — OXYMETAZOLINE HYDROCHLORIDE 0.05 G/100ML
2 SPRAY NASAL ONCE
Status: COMPLETED | OUTPATIENT
Start: 2021-08-25 | End: 2021-08-25

## 2021-08-25 RX ORDER — MULTIVITAMIN WITH IRON
250 TABLET ORAL DAILY
COMMUNITY
Start: 2020-04-04

## 2021-08-25 RX ORDER — FENTANYL CITRATE 50 UG/ML
25 INJECTION, SOLUTION INTRAMUSCULAR; INTRAVENOUS EVERY 5 MIN PRN
Status: DISCONTINUED | OUTPATIENT
Start: 2021-08-25 | End: 2021-08-25 | Stop reason: HOSPADM

## 2021-08-25 RX ORDER — ALBUTEROL SULFATE 0.83 MG/ML
2.5 SOLUTION RESPIRATORY (INHALATION) EVERY 4 HOURS PRN
Status: DISCONTINUED | OUTPATIENT
Start: 2021-08-25 | End: 2021-08-25 | Stop reason: HOSPADM

## 2021-08-25 RX ORDER — SODIUM CHLORIDE, SODIUM LACTATE, POTASSIUM CHLORIDE, CALCIUM CHLORIDE 600; 310; 30; 20 MG/100ML; MG/100ML; MG/100ML; MG/100ML
INJECTION, SOLUTION INTRAVENOUS CONTINUOUS
Status: DISCONTINUED | OUTPATIENT
Start: 2021-08-25 | End: 2021-08-26 | Stop reason: HOSPADM

## 2021-08-25 RX ORDER — LIDOCAINE HYDROCHLORIDE 40 MG/ML
SOLUTION TOPICAL PRN
Status: DISCONTINUED | OUTPATIENT
Start: 2021-08-25 | End: 2021-08-25 | Stop reason: HOSPADM

## 2021-08-25 RX ORDER — ONDANSETRON 2 MG/ML
4 INJECTION INTRAMUSCULAR; INTRAVENOUS EVERY 30 MIN PRN
Status: DISCONTINUED | OUTPATIENT
Start: 2021-08-25 | End: 2021-08-26 | Stop reason: HOSPADM

## 2021-08-25 RX ORDER — KETOROLAC TROMETHAMINE 30 MG/ML
15 INJECTION, SOLUTION INTRAMUSCULAR; INTRAVENOUS EVERY 6 HOURS PRN
Status: DISCONTINUED | OUTPATIENT
Start: 2021-08-25 | End: 2021-08-26 | Stop reason: HOSPADM

## 2021-08-25 RX ORDER — LORAZEPAM 2 MG/ML
.5-1 INJECTION INTRAMUSCULAR
Status: DISCONTINUED | OUTPATIENT
Start: 2021-08-25 | End: 2021-08-25 | Stop reason: HOSPADM

## 2021-08-25 RX ORDER — OXYMETAZOLINE HYDROCHLORIDE 0.05 G/100ML
SPRAY NASAL PRN
Status: DISCONTINUED | OUTPATIENT
Start: 2021-08-25 | End: 2021-08-25 | Stop reason: HOSPADM

## 2021-08-25 RX ORDER — LIDOCAINE HYDROCHLORIDE 20 MG/ML
INJECTION, SOLUTION INFILTRATION; PERINEURAL PRN
Status: DISCONTINUED | OUTPATIENT
Start: 2021-08-25 | End: 2021-08-25

## 2021-08-25 RX ORDER — SODIUM CHLORIDE, SODIUM LACTATE, POTASSIUM CHLORIDE, CALCIUM CHLORIDE 600; 310; 30; 20 MG/100ML; MG/100ML; MG/100ML; MG/100ML
INJECTION, SOLUTION INTRAVENOUS CONTINUOUS
Status: DISCONTINUED | OUTPATIENT
Start: 2021-08-25 | End: 2021-08-25 | Stop reason: HOSPADM

## 2021-08-25 RX ORDER — ACETAMINOPHEN 325 MG/1
975 TABLET ORAL ONCE
Status: DISCONTINUED | OUTPATIENT
Start: 2021-08-25 | End: 2021-08-26 | Stop reason: HOSPADM

## 2021-08-25 RX ORDER — HYDRALAZINE HYDROCHLORIDE 20 MG/ML
2.5-5 INJECTION INTRAMUSCULAR; INTRAVENOUS EVERY 10 MIN PRN
Status: DISCONTINUED | OUTPATIENT
Start: 2021-08-25 | End: 2021-08-25 | Stop reason: HOSPADM

## 2021-08-25 RX ORDER — GLYCOPYRROLATE 0.2 MG/ML
0.2 INJECTION, SOLUTION INTRAMUSCULAR; INTRAVENOUS ONCE
Status: COMPLETED | OUTPATIENT
Start: 2021-08-25 | End: 2021-08-25

## 2021-08-25 RX ORDER — TIZANIDINE 2 MG/1
2 TABLET ORAL PRN
COMMUNITY
Start: 2020-10-14 | End: 2022-06-07

## 2021-08-25 RX ADMIN — PROPOFOL 50 MCG/KG/MIN: 10 INJECTION, EMULSION INTRAVENOUS at 07:18

## 2021-08-25 RX ADMIN — PROPOFOL 20 MG: 10 INJECTION, EMULSION INTRAVENOUS at 07:18

## 2021-08-25 RX ADMIN — GLYCOPYRROLATE 0.2 MG: 0.2 INJECTION, SOLUTION INTRAMUSCULAR; INTRAVENOUS at 06:43

## 2021-08-25 RX ADMIN — SODIUM CHLORIDE, SODIUM LACTATE, POTASSIUM CHLORIDE, CALCIUM CHLORIDE: 600; 310; 30; 20 INJECTION, SOLUTION INTRAVENOUS at 07:08

## 2021-08-25 RX ADMIN — OXYMETAZOLINE HYDROCHLORIDE 2 SPRAY: 0.05 SPRAY NASAL at 06:42

## 2021-08-25 RX ADMIN — LIDOCAINE HYDROCHLORIDE 50 MG: 20 INJECTION, SOLUTION INFILTRATION; PERINEURAL at 07:39

## 2021-08-25 ASSESSMENT — MIFFLIN-ST. JEOR: SCORE: 1784.43

## 2021-08-25 NOTE — OP NOTE
PREOPERATIVE DIAGNOSIS:   obstructive sleep apnea.        POSTOPERATIVE DIAGNOSIS:   obstructive sleep apnea.        PROCEDURE:  Drug-induced sleep endoscopy.        SURGEON:  Dolly Dominguez MD        ANESTHESIA:  Monitored anesthesia care.        SPECIMENS REMOVED:  None.        COMPLICATIONS:  None.        INDICATIONS:  Patient is a 40 year old male with moderate to severe obstructive sleep apnea, who has difficulty tolerating CPAP therapy.        FINDINGS:  V: Upper soft palate was mixed with open position and AP collapse           O: 100% lateral collapse           T : 75% AP collapse            E: passive        With head turn to the right, patient does have complete collapse of the right and open left.  With jaw thrust, good improvement at tongue base, lateral walls, and velum      DESCRIPTION OF PROCEDURE:  The patient was brought into the operating room, placed on the operating table in supine position.  A member of the Department of Anesthesia was present and supplied the monitored anesthesia care.  A timeout was taken to correctly identify the patient and the procedure.  Once the patient reached an appropriate level of sedation, an endoscope was introduced into the patient's left nostril.  The upper airway was observed.  Findings are as above.  The scope was removed.  The patient was handed back over to Anesthesia and transferred to the PACU in stable condition.

## 2021-08-25 NOTE — DISCHARGE INSTRUCTIONS
[facility]  Home Care Following Anesthesia  For 24 hours after surgery:  1. Get plenty of rest.  A responsible adult must stay with you for at least 24 hours after you leave the surgery center.  2. Do not drive or use heavy equipment.  If you have weakness or tingling, don't drive or use heavy equipment until this feeling goes away.   3. Do not drink alcohol.   4. Avoid strenuous or risky activities.  Ask for help when climbing stairs.  5. You may feel lightheaded.  IF so, sit for a few minutes before standing.  Have someone help you get up.   6. If you have nausea (feel sick to your stomach): Drink only clear liquids such as apple juice, ginger ale, broth or 7-Up.  Rest may also help.  Be sure to drink enough fluids.  Move to a regular diet as you feel able.   7. You may have a slight fever.  Call the doctor if your fever is over 100 F (37.7 C) (taken under the tongue) or lasts longer than 24 hours.  8. You may have a dry mouth, a sore throat, muscle aches or trouble sleeping. These should go away after 24 hours.  9. Do not make important or legal decisions.   10. It is recommended to avoid smoking.               Tips for taking pain medications  To get the best pain relief possible, remember these points:    Take pain medications as directed, before pain becomes severe.    Pain medication can upset your stomach: taking it with food may help.    Constipation is a common side effect of pain medication. Drink plenty of  fluids.    Eat foods high in fiber. Take a stool softener if recommended by your doctor or pharmacist.    Do not drink alcohol, drive or operate machinery while taking pain medications.    Ask about other ways to control pain, such as with heat, ice or relaxation.    Tylenol/Acetaminophen Consumption  To help encourage the safe use of acetaminophen, the makers of TYLENOL  have lowered the maximum daily dose for single-ingredient Extra Strength TYLENOL   (acetaminophen) products sold in the [place] from 8 pills per day (4,000 mg) to 6 pills per day (3,000 mg). The dosing interval has also changed from 2 pills every 4-6 hours to 2 pills every 6 hours.    If you feel your pain relief is insufficient, you may take Tylenol/Acetaminophen in addition to your narcotic pain medication.     Be careful not to exceed 3,000 mg of Tylenol/Acetaminophen in a 24 hour period from all sources.    If you are taking extra strength Tylenol/acetaminophen (500 mg), the maximum dose is 6 tablets in 24 hours.    If you are taking regular strength acetaminophen (325 mg), the maximum dose is 9 tablets in 24 hours.    Call a doctor for any of the followin. Signs of infection (fever, growing tenderness at the surgery site, a large amount of drainage or bleeding, severe pain, foul-smelling drainage, redness, swelling).  2. It has been over 8 to 10 hours since surgery and you are still not able to urinate (pass water).  3. Headache for over 24 hours.  4. Numbness, tingling or weakness the day after surgery (if you had spinal anesthesia).  5. Signs of Covid-19 infection (temperature over 100 degrees, shortness of breath, cough, loss of taste/smell, generalized body aches, persistent headache, chills, sore throat, nausea/vomiting/diarrhea)  Your doctor is:  Dr. Dolly Dominguez, ENT Otolaryngology: [phone]                    Or dial [phone] and ask for the resident on call for:  ENT Otolaryngology  For emergency care, call the:  [facility]:  [phone] (TTY for hearing impaired: [phone])

## 2021-08-25 NOTE — ANESTHESIA POSTPROCEDURE EVALUATION
Patient: Srinivas Young    Procedure(s):  DRUG-INDUCED SLEEP ENDOSCOPY    Diagnosis:RICHY (obstructive sleep apnea) [G47.33]  Diagnosis Additional Information: No value filed.    Anesthesia Type:  MAC    Note:  Disposition: Outpatient   Postop Pain Control: Uneventful            Sign Out: Well controlled pain   PONV:    Neuro/Psych: Uneventful            Sign Out: Acceptable/Baseline neuro status   Airway/Respiratory: Uneventful            Sign Out: Acceptable/Baseline resp. status   CV/Hemodynamics: Uneventful            Sign Out: Acceptable CV status; No obvious hypovolemia; No obvious fluid overload   Other NRE:    DID A NON-ROUTINE EVENT OCCUR?            Last vitals:  Vitals Value Taken Time   /84 08/25/21 0810   Temp 36.6  C (97.9  F) 08/25/21 0810   Pulse     Resp 16 08/25/21 0810   SpO2 99 % 08/25/21 0810       Electronically Signed By: Kin Schmidt MD  August 25, 2021  10:14 AM

## 2021-08-25 NOTE — ANESTHESIA CARE TRANSFER NOTE
Patient: Srinivas Young    Procedure(s):  DRUG-INDUCED SLEEP ENDOSCOPY    Diagnosis: RICHY (obstructive sleep apnea) [G47.33]  Diagnosis Additional Information: No value filed.    Anesthesia Type:   MAC     Note:    Oropharynx: oropharynx clear of all foreign objects and spontaneously breathing  Level of Consciousness: drowsy  Oxygen Supplementation: room air    Independent Airway: airway patency satisfactory and stable  Dentition: dentition unchanged  Vital Signs Stable: post-procedure vital signs reviewed and stable  Report to RN Given: handoff report given  Patient transferred to: Phase II    Handoff Report: Identifed the Patient, Identified the Reponsible Provider, Reviewed the pertinent medical history, Discussed the surgical course, Reviewed Intra-OP anesthesia mangement and issues during anesthesia, Set expectations for post-procedure period and Allowed opportunity for questions and acknowledgement of understanding      Vitals:  Vitals Value Taken Time   BP     Temp     Pulse     Resp     SpO2         Electronically Signed By: JAZLYN De Paz CRNA  August 25, 2021  7:40 AM

## 2021-08-25 NOTE — OR NURSING
Pt took his home med immetrex (sumatriptan) 6mg injection  in pre-op.  BHAVYA Schmidt and Attending surgeon Agnelica ok with pt doing this.

## 2021-08-26 ENCOUNTER — PATIENT OUTREACH (OUTPATIENT)
Dept: OTOLARYNGOLOGY | Facility: CLINIC | Age: 41
End: 2021-08-26

## 2021-08-26 NOTE — PROGRESS NOTES
Writer spoke with patient, patient is doing well post DICE. Has no issues post anaesthesia. Writer confirmed follow-up with patient for 8/31 at 1330.    Maria De Jesus Curtis RN on 8/26/2021 at 9:56 AM

## 2021-08-27 ENCOUNTER — VIRTUAL VISIT (OUTPATIENT)
Dept: PSYCHIATRY | Facility: CLINIC | Age: 41
End: 2021-08-27
Payer: MEDICARE

## 2021-08-27 ENCOUNTER — DOCUMENTATION ONLY (OUTPATIENT)
Dept: OTOLARYNGOLOGY | Facility: CLINIC | Age: 41
End: 2021-08-27

## 2021-08-27 ENCOUNTER — TELEPHONE (OUTPATIENT)
Dept: OTOLARYNGOLOGY | Facility: CLINIC | Age: 41
End: 2021-08-27

## 2021-08-27 ENCOUNTER — MYC MEDICAL ADVICE (OUTPATIENT)
Dept: OTOLARYNGOLOGY | Facility: CLINIC | Age: 41
End: 2021-08-27

## 2021-08-27 DIAGNOSIS — F32.2 CURRENT SEVERE EPISODE OF MAJOR DEPRESSIVE DISORDER WITHOUT PSYCHOTIC FEATURES WITHOUT PRIOR EPISODE (H): Primary | ICD-10-CM

## 2021-08-27 ASSESSMENT — PATIENT HEALTH QUESTIONNAIRE - PHQ9: SUM OF ALL RESPONSES TO PHQ QUESTIONS 1-9: 19

## 2021-08-27 NOTE — PROGRESS NOTES
Patient called the call center requesting an appointment to be seen with neurotology for an ear problem as recommended by Dr. Dominguez.    Spoke with provider who okayed this and stated that patient could see either Dr. Nissen or Dr. Holden.    Message sent to scheduling team to schedule patient for next available with either Dr. Nissen or Dr. Holden.    Patient sent a [employer] message updating him that someone would reach out to him in the next few days to schedule him for this.    Patient has a telephone follow up with Dr. Dominguez next Tuesday.    FABI GLYNN LPN on 8/27/2021 at 3:18 PM

## 2021-08-27 NOTE — PROGRESS NOTES
Srinivas is a 40 year old who is being evaluated via a billable video visit.      How would you like to obtain your AVS? MyChart  If the video visit is dropped, the invitation should be resent by: Send to e-mail at: [email or web address]  Will anyone else be joining your video visit? No       Depression Response    Patient completed the PHQ-9 assessment for depression and scored >9? Yes  Question 9 on the PHQ-9 was positive for suicidality? Yes  Does patient have current mental health provider? Yes - seeing new therapist of DBT and NP for medication management.    Is this a virtual visit? Yes   Does patient have suicidal ideation (positive question 9)? No - offer to place Mental Health Referral.  Patient declined referral/not needed           Video Start Time: 1:00 pm  Video-Visit Details    Type of service:  Video Visit    Video End Time:2:16 pm    Originating Location (pt. Location): Home    Distant Location (provider location):  [facility]     Platform used for Video Visit: ToniWell

## 2021-08-27 NOTE — TELEPHONE ENCOUNTER
[facility] Call Center    Phone Message    May a detailed message be left on voicemail: yes     Reason for Call:  Pt called and would cecilio a referral for Ear Neurotology for right ear hearing loss. He spoke to Dr. Angelica quinones recently, but I did not see any notes in the chart.  Thank you.       Action Taken: Message routed to:  [facility] ENT    Travel Screening: Not Applicable

## 2021-08-30 NOTE — TELEPHONE ENCOUNTER
FUTURE VISIT INFORMATION      FUTURE VISIT INFORMATION:    Date: 11/11/2021    Time: 4PM    Location: [facility]  REFERRAL INFORMATION:    Referring provider:  Dr Dolly Dominguez    Referring providers clinic:  [facility]  [facility]     Reason for visit/diagnosis  Appt for his ear per pt. Dr. Dominguez OK'd either Dr. Nissen or Dr. Holden. Dr. Dominguez does not believe that the patient needs to see a neurotologist.    RECORDS REQUESTED FROM:       Clinic name Comments Records Status Imaging Status   [facility]  8/27/2021 note from Dr Dominguez  EPIC

## 2021-08-31 ENCOUNTER — VIRTUAL VISIT (OUTPATIENT)
Dept: OTOLARYNGOLOGY | Facility: CLINIC | Age: 41
End: 2021-08-31
Payer: MEDICARE

## 2021-08-31 DIAGNOSIS — G47.33 OSA (OBSTRUCTIVE SLEEP APNEA): Primary | ICD-10-CM

## 2021-08-31 PROCEDURE — 99441 PR PHYSICIAN TELEPHONE EVALUATION 5-10 MIN: CPT | Mod: 95 | Performed by: OTOLARYNGOLOGY

## 2021-08-31 NOTE — LETTER
8/31/2021       RE: Srinivas Young  [address]  [address]  [place] [address]     Dear Colleague,    Thank you for referring your patient, Srinivas Young, to the [facility] [place] [facility]. Please see a copy of my visit note below.    Srinivas is a 40 year old who is being evaluated via a billable telephone visit.      What phone number would you like to be contacted at? [phone]  How would you like to obtain your AVS? Laisha Mccoy, EMT    CC: f/u DISE    HPI: Patient underwent drug-induced sleep endoscopy last week.  This showed anterior posterior collapse at the level of the tongue base as well as soft palate.  He did have a good response to jaw thrust.  Since I last spoke to the patient patient reports that he has been having some difficulties with his mental health.  He could potentially be undergoing transcranial magnetic stimulation therapy.  He wonders if this might have overall impact on inspire.    A/P:  I discussed with the patient that I will contact the company and see if there are any adverse interaction between transcranial magnetic stimulation and the inspire device.  Regardless at this time my recommendation to the patient would be to have him focus on his mental health as the first priority.  I think at this time we should hold on inspire until he is in a better spot.  Like to touch base with him in about 4 months and see how he is doing.  This can be done as a telephone visit.    I spent a total of 10 minutes total time towards today's visit.  Time spent included seeing and evaluating Srinivas Young during today's office visit, which included counseling the patient.  Time was also spent reviewing records/tests; and documenting clinical information in the electronic health record.        Again, thank you for allowing me to participate in the care of your patient.      Sincerely,    Dolly Dominguez,  MD

## 2021-08-31 NOTE — PATIENT INSTRUCTIONS
1. You were seen in the clinic today by Dr. Dominguez.    2.   We will follow up with you by telephone in about 4 months. Someone will call to set you up for an appointment. Please call us if you need something in the mean time. Once you are ready we can send through your paperwork for approval.     3.   If symptoms worsen or fail to improve, please return to the ENT clinic.    If you have any questions or concerns after your appointment, please call the clinic.    -Clinic phone [phone]. Option #1 for scheduling related needs. Option #3 for nurse advice.       Hilda Campos, BLACKN  [phone]    Maria De Jesus Curtis, RNCC  [phone]    [facility]  Department of Otolaryngology

## 2021-08-31 NOTE — PROGRESS NOTES
Srinivas is a 40 year old who is being evaluated via a billable telephone visit.      What phone number would you like to be contacted at? [phone]  How would you like to obtain your AVS? Laisha Mccoy, EMT    CC: f/u DISE    HPI: Patient underwent drug-induced sleep endoscopy last week.  This showed anterior posterior collapse at the level of the tongue base as well as soft palate.  He did have a good response to jaw thrust.  Since I last spoke to the patient patient reports that he has been having some difficulties with his mental health.  He could potentially be undergoing transcranial magnetic stimulation therapy.  He wonders if this might have overall impact on inspire.    A/P:  I discussed with the patient that I will contact the company and see if there are any adverse interaction between transcranial magnetic stimulation and the inspire device.  Regardless at this time my recommendation to the patient would be to have him focus on his mental health as the first priority.  I think at this time we should hold on inspire until he is in a better spot.  Like to touch base with him in about 4 months and see how he is doing.  This can be done as a telephone visit.    I spent a total of 10 minutes total time towards today's visit.  Time spent included seeing and evaluating Srinivas Young during today's office visit, which included counseling the patient.  Time was also spent reviewing records/tests; and documenting clinical information in the electronic health record.

## 2021-09-05 ENCOUNTER — HEALTH MAINTENANCE LETTER (OUTPATIENT)
Age: 41
End: 2021-09-05
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
## 2021-09-15 ENCOUNTER — OFFICE VISIT (OUTPATIENT)
Dept: PSYCHIATRY | Facility: CLINIC | Age: 41
End: 2021-09-15
Payer: MEDICARE

## 2021-09-15 VITALS
WEIGHT: 204.2 LBS | BODY MASS INDEX: 30.24 KG/M2 | SYSTOLIC BLOOD PRESSURE: 127 MMHG | HEART RATE: 79 BPM | TEMPERATURE: 97.2 F | HEIGHT: 69 IN | DIASTOLIC BLOOD PRESSURE: 86 MMHG

## 2021-09-15 DIAGNOSIS — F51.02 ADJUSTMENT INSOMNIA: ICD-10-CM

## 2021-09-15 DIAGNOSIS — F14.21: ICD-10-CM

## 2021-09-15 DIAGNOSIS — F12.21: ICD-10-CM

## 2021-09-15 DIAGNOSIS — F33.2 SEVERE EPISODE OF RECURRENT MAJOR DEPRESSIVE DISORDER, WITHOUT PSYCHOTIC FEATURES (H): ICD-10-CM

## 2021-09-15 DIAGNOSIS — F41.1 GENERALIZED ANXIETY DISORDER: Primary | ICD-10-CM

## 2021-09-15 ASSESSMENT — PATIENT HEALTH QUESTIONNAIRE - PHQ9: SUM OF ALL RESPONSES TO PHQ QUESTIONS 1-9: 19

## 2021-09-15 ASSESSMENT — MIFFLIN-ST. JEOR: SCORE: 1821.63

## 2021-09-15 NOTE — LETTER
September 17, 2021      Srinivas Young  [address]  [address]  [facility] [place] [facility] [address]        To Whom It May Concern:    Srinivas Young  was seen on ***.  Please excuse him  until *** due to {WORK EXCUSE:602612}.        Sincerely,        Maximo Ching MD

## 2021-09-15 NOTE — PROGRESS NOTES
Depression Response    Patient completed the PHQ-9 assessment for depression and scored >9? Yes  Question 9 on the PHQ-9 was positive for suicidality? Yes  Does patient have current mental health provider? Yes    Is this a virtual visit? No    I personally notified the following: visit provider

## 2021-09-17 NOTE — PATIENT INSTRUCTIONS
"        Patient Education   Tips for Sleep Hygiene  \"Sleep hygiene\" means having good sleep habits.Follow these tips to sleep better at night:     Get on a schedule. Go to bed and get up at about the same time every day.    Listen to your body. Only try to sleep when you actually feel tired or sleepy.    Be patient. If you haven't been able to get to sleep after about 30 minutes or more, get up and do something calming or boring until you feel sleepy. Then return to bed and try again.    Don't have caffeine (coffee, tea, cola drinks, chocolate and some medicines), alcohol or nicotine (cigarettes). These can make it harder for you to fall asleep and stay asleep.    Use your bed for sleeping only. That means no TV, computer or homework in bed, especially during the evening and before bedtime.    Don't nap during the day. If you must nap, make sure it is for less than 20 minutes.    Create sleep rituals that remind your body it is time to sleep. Examples include breathing exercises, stretching or reading a book.    Avoid all electronic media (smart phone, computer, tablet) within 2 hours of bed time. The \"blue light\" in these devices activates the part of the brain that keeps you awake.    Dim the lights at night.    Get early morning sources of light (walk in the sunshine) to help set sleep patterns at night.    Try a bath or shower before bed. Having a warm bath 1 to 2 hours before bedtime can help you feel sleepy. Hot baths can make you alert, so be mindful of the temperature.    Don't watch the clock. Checking the clock during the night can wake you up. It can also lead to negative thoughts such as, \"I will never fall asleep,\" which can increase anxiety and sleeplessness.    Use a sleep diary. Track your sleep schedule to know your sleep patterns and to see where you can improve.    Get regular exercise every day. Try not to do heavy exercise in the 4 hours before bedtime.    Eat a healthy, balanced diet.    Try " eating a light, healthy snack before bed, but avoid eating a heavy meal.    Create the right sleeping area. A cool, dark, quiet room is best. If needed, try earplugs, fans and blackout curtains.    Keep your daytime routine the same even if you have a bad night sleep. Avoiding activities the next day can make it harder to sleep.  For informational purposes only. Not to replace the advice of your health care provider.   Copyright   2013 [facility]. All rights reserved. [employer] 569799 - 01/16.

## 2021-09-27 ENCOUNTER — TELEPHONE (OUTPATIENT)
Dept: PSYCHIATRY | Facility: CLINIC | Age: 41
End: 2021-09-27

## 2021-10-04 ENCOUNTER — TELEPHONE (OUTPATIENT)
Dept: PSYCHIATRY | Facility: CLINIC | Age: 41
End: 2021-10-04
Payer: MEDICARE

## 2021-10-04 NOTE — TELEPHONE ENCOUNTER
What is the concern that needs to be addressed by a nurse? Pt was supposed to start TMS tomorrow, 10/5/21, but cannot start until later due to a  on Wednesday 10/6/21. Please call pt back to discuss.     May a detailed message be left on voicemail? yes    Date of last office visit: 9/15/21    Message routed to: brooke ho, psych rn pool

## 2021-10-05 ENCOUNTER — ALLIED HEALTH/NURSE VISIT (OUTPATIENT)
Dept: PSYCHIATRY | Facility: CLINIC | Age: 41
End: 2021-10-05
Payer: MEDICARE

## 2021-10-05 ENCOUNTER — OFFICE VISIT (OUTPATIENT)
Dept: PSYCHIATRY | Facility: CLINIC | Age: 41
End: 2021-10-05
Payer: MEDICARE

## 2021-10-05 DIAGNOSIS — F33.2 SEVERE EPISODE OF RECURRENT MAJOR DEPRESSIVE DISORDER, WITHOUT PSYCHOTIC FEATURES (H): Primary | ICD-10-CM

## 2021-10-05 ASSESSMENT — PATIENT HEALTH QUESTIONNAIRE - PHQ9: SUM OF ALL RESPONSES TO PHQ QUESTIONS 1-9: 17

## 2021-10-05 NOTE — PROGRESS NOTES
TMS Education     Srinivas Young MRN# [id number]  Age: 40 year old year old YOB: 1980        Patient is here in clinic for TMS education and consenting.  Patient will be starting TMS today on the [employer].  Writer and patient reviewed mechanics of TMS.  Discussed using magnetic pulses to stimulate and induce an electrical field inside the brain.  Discussed the difference between F8 and H1 coil.  Patient was able to verbalize understanding of this.  Discussed that the idea is that we are treating the DLPFC of the brain, as it has been shown by in studies that people who have depression have decreased activity in this part of the brain, the idea is that we stimulate this part of the brain to increase activity.       Reviewed processing of mapping and how visit today would go.  Encouraged patient to communicate with staff if treatment at anytime became painful.         Discussed side effects of TMS.  Side effects include headaches after treatment, hearing loss, lightheadedness/dizziness, short lived vision changes, and seizures.  Discussed ways that TMS Clinic helps with preventing these side effects.  Such as retesting motor thresholds and having patient wear ear plugs.  Instructed patient that he can take OTC pain relievers such as tylenol or IBU if he has a headache after today's treatment.  Reviewed safety concerns regarding sleep and use of illegal drugs/alcohol.        Patient was able to ask questions regarding procedure.  Patient informed of 10 minute late policy and attendance policy.  Consent was signed by patient today.

## 2021-10-05 NOTE — PROGRESS NOTES
[facility] TMS Program  [address], [address]  [place] [address]  TMS Procedure Note   Srinivas Young MRN# [id number]  Age: 40 year old year old YOB: 1980    Pre-Procedure:  History and Physical: Reviewed in medical record  Consent Signed by: Srinivas Young  On: 10/5/21    Clinical Narrative:  Patient presents to initiate an acute course of TMS for management of acute symptoms of depression that have not responded to conventional interventions (pharmacotherapy or psychotherapy).      Indications for TMS:  MDD, recurrent, severe; 4+ medication trials (from 2+ classes) ineffective; Psychotherapy ineffective.     Pre-Procedure Diagnosis:  MDD, recurrent, severe 296.33    Treatment Hx:  Treatment number this series: 1  Total lifetime treatment number: 1    No Known Allergies   There were no vitals taken for this visit.    Pause for the Cause  Right patient:  Yes  Right procedure/correct coil:  Yes; rTMS; cpt 12918; H1 coil.   Earplugs in place:  Yes     Procedure  Patient was seated in procedure chair. Identity and procedure was verified. Ear plugs were placed in ears and patient-specific cap was placed on head and tightened appropriately. Ruler locations were placed on head per  specifications. Coil was placed at  0-7-15.5 and stimulator was set to initial 50%.  Mapping pulses were delivered and response was quantified by visual inspection..  MT was found with specific location and energy detailed below. Coil was then placed at treatment location and stimulator was set to parameters below Given pt tolerance, 55 treatment trains were delivered. Pt tolerated procedure.  Motor Threshold Determination  Distance from nasion to inion:   MT 1: 0 - 7 - 15.5 @ 61% on 10/5/2021    Stimulation Parameters  Frequency: 18 Hz     Train duration: 2 sec  Total pulses delivered: 1980  Inter-train interval: 20 sec  Tx Loc: 0 - eye  - 15.5  Energy: 55% (90% MT)  Trains: 55 trains    Date MADRS IDS-SR  PHQ-9   10/5/2021 24 29 17       Post-Procedure Diagnosis:  MDD, recurrent, severe 296.33    Zara Hurtado, TMS Technician  [facility] Neuromodulation    Plan   - Cont TMS  - increase intensity as tolerable    I completed motor mapping and determination of the pt's motor threshold during the visit today. I was available in the clinic throughout the treatment.    Liz Hampton MD  [facility] Neuromodulation

## 2021-10-06 ENCOUNTER — TELEPHONE (OUTPATIENT)
Dept: PSYCHIATRY | Facility: CLINIC | Age: 41
End: 2021-10-06

## 2021-10-06 NOTE — TELEPHONE ENCOUNTER
Writer called patient due to no show.  Patient informed writer that he told [employer] tech Zara that he would not be here today.  He will be at treatment tomorrow.

## 2021-10-07 ENCOUNTER — OFFICE VISIT (OUTPATIENT)
Dept: PSYCHIATRY | Facility: CLINIC | Age: 41
End: 2021-10-07
Payer: MEDICARE

## 2021-10-07 DIAGNOSIS — F33.2 SEVERE EPISODE OF RECURRENT MAJOR DEPRESSIVE DISORDER, WITHOUT PSYCHOTIC FEATURES (H): Primary | ICD-10-CM

## 2021-10-07 ASSESSMENT — PATIENT HEALTH QUESTIONNAIRE - PHQ9: SUM OF ALL RESPONSES TO PHQ QUESTIONS 1-9: 10

## 2021-10-07 NOTE — PROGRESS NOTES
[facility] TMS Program  [address], [address]  [place] [address]  TMS Procedure Note   Srinivas Young MRN# [id number]  Age: 40 year old year old YOB: 1980    Pre-Procedure:  History and Physical: Reviewed in medical record  Consent Signed by: Srinivas Young  On: 10/5/21    Clinical Narrative:  Patient is tolerating treatment. Energy was increased to 100% and patient tolerated it well.    Indications for TMS:  MDD, recurrent, severe; 4+ medication trials (from 2+ classes) ineffective; Psychotherapy ineffective.     Pre-Procedure Diagnosis:  MDD, recurrent, severe 296.33    Treatment Hx:  Treatment number this series: 2  Total lifetime treatment number: 2    No Known Allergies   There were no vitals taken for this visit.    Pause for the Cause  Right patient:  Yes  Right procedure/correct coil:  Yes; rTMS; cpt 72833; H1 coil.   Earplugs in place:  Yes     Procedure  Patient was seated in procedure chair. Identity and procedure was verified. Ear plugs were placed in ears and patient-specific cap was placed on head and tightened appropriately. Ruler locations were placed on head per  specifications. Coil was placed at  0-7-15.5 and stimulator was set to initial 50%.  Mapping pulses were delivered and response was quantified by visual inspection..  MT was found with specific location and energy detailed below. Coil was then placed at treatment location and stimulator was set to parameters below Given pt tolerance, 55 treatment trains were delivered. Pt tolerated procedure.  Motor Threshold Determination  Distance from nasion to inion:   MT 1: 0 - 7 - 15.5 @ 61% on 10/5/2021    Stimulation Parameters  Frequency: 18 Hz     Train duration: 2 sec  Total pulses delivered: 1980  Inter-train interval: 20 sec  Tx Loc: 0 - eye  - 15.5  Energy: 55% (100% MT)  Trains: 55 trains    Date MADRS IDS-SR PHQ-9   10/5/2021 24 29 17       Post-Procedure Diagnosis:  MDD, recurrent, severe 296.33    Laura  NADJA Hernández Technician  [facility] Neuromodulation    Plan   - Cont TMS  - increase intensity as tolerable      I did not see the patient during/after treatment but remained available in the clinic during  treatment.    Liz Hampton MD  [facility] Neu[facility]od[facility]

## 2021-10-08 ENCOUNTER — OFFICE VISIT (OUTPATIENT)
Dept: PSYCHIATRY | Facility: CLINIC | Age: 41
End: 2021-10-08
Payer: MEDICARE

## 2021-10-08 DIAGNOSIS — F33.2 SEVERE EPISODE OF RECURRENT MAJOR DEPRESSIVE DISORDER, WITHOUT PSYCHOTIC FEATURES (H): Primary | ICD-10-CM

## 2021-10-08 ASSESSMENT — PATIENT HEALTH QUESTIONNAIRE - PHQ9: SUM OF ALL RESPONSES TO PHQ QUESTIONS 1-9: 7

## 2021-10-08 NOTE — PROGRESS NOTES
[facility] TMS Program  [address], [address]  [place] [address]  TMS Procedure Note   Srinivas Young MRN# [id number]  Age: 40 year old year old YOB: 1980    Pre-Procedure:  History and Physical: Reviewed in medical record  Consent Signed by: Srinivas Young  On: 10/5/21    Clinical Narrative:  Patient is tolerating treatment.       Indications for TMS:  MDD, recurrent, severe; 4+ medication trials (from 2+ classes) ineffective; Psychotherapy ineffective.     Pre-Procedure Diagnosis:  MDD, recurrent, severe 296.33    Treatment Hx:  Treatment number this series: 3  Total lifetime treatment number: 3    No Known Allergies   There were no vitals taken for this visit.    Pause for the Cause  Right patient:  Yes  Right procedure/correct coil:  Yes; rTMS; cpt 12723; H1 coil.   Earplugs in place:  Yes     Procedure  Patient was seated in procedure chair. Identity and procedure was verified. Ear plugs were placed in ears and patient-specific cap was placed on head and tightened appropriately. Ruler locations were placed on head per  specifications. Coil was placed at  0-7-15.5 and stimulator was set to initial 50%.  Mapping pulses were delivered and response was quantified by visual inspection..  MT was found with specific location and energy detailed below. Coil was then placed at treatment location and stimulator was set to parameters below Given pt tolerance, 55 treatment trains were delivered. Pt tolerated procedure.  Motor Threshold Determination  Distance from nasion to inion:   MT 1: 0 - 7 - 15.5 @ 61% on 10/5/2021    Stimulation Parameters  Frequency: 18 Hz     Train duration: 2 sec  Total pulses delivered: 1980  Inter-train interval: 20 sec  Tx Loc: 0 - eye  - 15.5  Energy: 55% (100% MT)  Trains: 55 trains    Date MADRS IDS-SR PHQ-9   10/5/2021 24 29 17       Post-Procedure Diagnosis:  MDD, recurrent, severe 296.33    Laura Hernández, TMS Technician  [facility] Neuromodulation    Plan   - Cont TMS  - increase intensity as tolerable    I did not see the patient during/after treatment but remained available in the clinic during  treatment.      Darnell Marrero MD  [facility] Neuromodulation

## 2021-10-11 ENCOUNTER — OFFICE VISIT (OUTPATIENT)
Dept: PSYCHIATRY | Facility: CLINIC | Age: 41
End: 2021-10-11
Payer: MEDICARE

## 2021-10-11 DIAGNOSIS — F33.2 SEVERE EPISODE OF RECURRENT MAJOR DEPRESSIVE DISORDER, WITHOUT PSYCHOTIC FEATURES (H): Primary | ICD-10-CM

## 2021-10-11 ASSESSMENT — PATIENT HEALTH QUESTIONNAIRE - PHQ9: SUM OF ALL RESPONSES TO PHQ QUESTIONS 1-9: 8

## 2021-10-11 NOTE — PROGRESS NOTES
[facility] TMS Program  [address], [address]  [place] [address]  TMS Procedure Note   Srinivas Young MRN# [id number]  Age: 40 year old year old YOB: 1980    Pre-Procedure:  History and Physical: Reviewed in medical record  Consent Signed by: Srinivas Young  On: 10/5/21    Clinical Narrative:  Patient is tolerating treatment. Patient had a good weekend. Chose not to wear mouth guard today.    Indications for TMS:  MDD, recurrent, severe; 4+ medication trials (from 2+ classes) ineffective; Psychotherapy ineffective.     Pre-Procedure Diagnosis:  MDD, recurrent, severe 296.33    Treatment Hx:  Treatment number this series: 4  Total lifetime treatment number: 4    No Known Allergies   There were no vitals taken for this visit.    Pause for the Cause  Right patient:  Yes  Right procedure/correct coil:  Yes; rTMS; cpt 85284; H1 coil.   Earplugs in place:  Yes     Procedure  Patient was seated in procedure chair. Identity and procedure was verified. Ear plugs were placed in ears and patient-specific cap was placed on head and tightened appropriately. Ruler locations were placed on head per  specifications. Coil was placed at  0-7-15.5 and stimulator was set to initial 50%.  Mapping pulses were delivered and response was quantified by visual inspection..  MT was found with specific location and energy detailed below. Coil was then placed at treatment location and stimulator was set to parameters below Given pt tolerance, 55 treatment trains were delivered. Pt tolerated procedure.  Motor Threshold Determination  Distance from nasion to inion:   MT 1: 0 - 7 - 15.5 @ 61% on 10/5/2021    Stimulation Parameters  Frequency: 18 Hz     Train duration: 2 sec  Total pulses delivered: 1980  Inter-train interval: 20 sec  Tx Loc: 0 - eye  - 15.5  Energy: 64% (105% MT)  Trains: 55 trains    Date MADRS IDS-SR PHQ-9   10/5/2021 24 29 17       Post-Procedure Diagnosis:  MDD, recurrent, severe  296.33    Zara Hurtado, TMS Technician  [facility]    Plan   - Cont TMS  - increase intensity as tolerable    I did not see patient but remained available in the clinic throughout the TMS session.      Pilar Wynne MD  [facility]

## 2021-10-12 ENCOUNTER — OFFICE VISIT (OUTPATIENT)
Dept: PSYCHIATRY | Facility: CLINIC | Age: 41
End: 2021-10-12
Payer: MEDICARE

## 2021-10-12 DIAGNOSIS — F33.2 SEVERE EPISODE OF RECURRENT MAJOR DEPRESSIVE DISORDER, WITHOUT PSYCHOTIC FEATURES (H): Primary | ICD-10-CM

## 2021-10-12 ASSESSMENT — PATIENT HEALTH QUESTIONNAIRE - PHQ9: SUM OF ALL RESPONSES TO PHQ QUESTIONS 1-9: 8

## 2021-10-12 NOTE — PROCEDURES
[facility] TMS Program  [address], [address]  [place] [address]  TMS Procedure Note   Srinivas Young MRN# [id number]  Age: 40 year old year old YOB: 1980    Pre-Procedure:  History and Physical: Reviewed in medical record  Consent Signed by: Srinivas Young  On: 10/5/21    Clinical Narrative:  Patient is tolerating treatment. Chose not to wear mouth guard today. Tolerated increase to 110%.    Indications for TMS:  MDD, recurrent, severe; 4+ medication trials (from 2+ classes) ineffective; Psychotherapy ineffective.     Pre-Procedure Diagnosis:  MDD, recurrent, severe 296.33    Treatment Hx:  Treatment number this series: 5  Total lifetime treatment number: 5    No Known Allergies   There were no vitals taken for this visit.    Pause for the Cause  Right patient:  Yes  Right procedure/correct coil:  Yes; rTMS; cpt 87793; H1 coil.   Earplugs in place:  Yes     Procedure  Patient was seated in procedure chair. Identity and procedure was verified. Ear plugs were placed in ears and patient-specific cap was placed on head and tightened appropriately. Ruler locations were placed on head per  specifications. Coil was placed at  0-7-15.5 and stimulator was set to initial 50%.  Mapping pulses were delivered and response was quantified by visual inspection..  MT was found with specific location and energy detailed below. Coil was then placed at treatment location and stimulator was set to parameters below Given pt tolerance, 55 treatment trains were delivered. Pt tolerated procedure.  Motor Threshold Determination  Distance from nasion to inion:   MT 1: 0 - 7 - 15.5 @ 61% on 10/5/2021    Stimulation Parameters  Frequency: 18 Hz     Train duration: 2 sec  Total pulses delivered: 1980  Inter-train interval: 20 sec  Tx Loc: 0 - eye  - 15.5  Energy: 68% (110% MT)  Trains: 55 trains    Date MADRS IDS-SR PHQ-9   10/5/2021 24 29 17       Post-Procedure Diagnosis:  MDD, recurrent, severe  296.33    Thu Ramos RN  [facility]  [facility]    Plan   - Cont TMS  - increase intensity as tolerable      C. Darleen Hampton MD  [facility]

## 2021-10-13 ENCOUNTER — OFFICE VISIT (OUTPATIENT)
Dept: PSYCHIATRY | Facility: CLINIC | Age: 41
End: 2021-10-13
Payer: MEDICARE

## 2021-10-13 DIAGNOSIS — F33.2 SEVERE EPISODE OF RECURRENT MAJOR DEPRESSIVE DISORDER, WITHOUT PSYCHOTIC FEATURES (H): Primary | ICD-10-CM

## 2021-10-13 NOTE — PROGRESS NOTES
[facility] TMS Program  [address], [address]  [place] [address]  TMS Procedure Note   Srinivas Young MRN# [id number]  Age: 40 year old year old YOB: 1980    Pre-Procedure:  History and Physical: Reviewed in medical record  Consent Signed by: Srinivas Young  On: 10/5/21    Clinical Narrative:  Patient is tolerating treatment. Chose not to wear mouth guard today. Patient reports getting a job! He feels treatment has been very helpful.    Indications for TMS:  MDD, recurrent, severe; 4+ medication trials (from 2+ classes) ineffective; Psychotherapy ineffective.     Pre-Procedure Diagnosis:  MDD, recurrent, severe 296.33    Treatment Hx:  Treatment number this series: 6  Total lifetime treatment number: 6    No Known Allergies   There were no vitals taken for this visit.    Pause for the Cause  Right patient:  Yes  Right procedure/correct coil:  Yes; rTMS; cpt 74361; H1 coil.   Earplugs in place:  Yes     Procedure  Patient was seated in procedure chair. Identity and procedure was verified. Ear plugs were placed in ears and patient-specific cap was placed on head and tightened appropriately. Ruler locations were placed on head per  specifications. Coil was placed at  0-7-15.5 and stimulator was set to initial 50%.  Mapping pulses were delivered and response was quantified by visual inspection..  MT was found with specific location and energy detailed below. Coil was then placed at treatment location and stimulator was set to parameters below Given pt tolerance, 55 treatment trains were delivered. Pt tolerated procedure.  Motor Threshold Determination  Distance from nasion to inion:   MT 1: 0 - 7 - 15.5 @ 61% on 10/5/2021    Stimulation Parameters  Frequency: 18 Hz     Train duration: 2 sec  Total pulses delivered: 1980  Inter-train interval: 20 sec  Tx Loc: 0 - eye  - 15.5  Energy: 70% (115% MT)  Trains: 55 trains    Date MADRS IDS-SR PHQ-9   10/5/2021 24 29 17       Post-Procedure  Diagnosis:  MDD, recurrent, severe 296.33    Zara Hurtado, TMS Technician  [facility]    Plan   - Cont TMS  - increase intensity as tolerable  -I did not see the patient but remained available throughout treatment.       London Shelton MD  [facility]

## 2021-10-14 ENCOUNTER — OFFICE VISIT (OUTPATIENT)
Dept: PSYCHIATRY | Facility: CLINIC | Age: 41
End: 2021-10-14
Payer: MEDICARE

## 2021-10-14 DIAGNOSIS — F33.2 SEVERE EPISODE OF RECURRENT MAJOR DEPRESSIVE DISORDER, WITHOUT PSYCHOTIC FEATURES (H): Primary | ICD-10-CM

## 2021-10-14 ASSESSMENT — PATIENT HEALTH QUESTIONNAIRE - PHQ9: SUM OF ALL RESPONSES TO PHQ QUESTIONS 1-9: 9

## 2021-10-14 NOTE — PROGRESS NOTES
[facility] TMS Program  [address], [address]  [place] [address]  TMS Procedure Note   Srinivas Young MRN# [id number]  Age: 40 year old year old YOB: 1980    Pre-Procedure:  History and Physical: Reviewed in medical record  Consent Signed by: Srinivas Young  On: 10/5/21    Clinical Narrative:  Patient is tolerating treatment. Chose to wear mouth guard today. Patient was doing well today.     Indications for TMS:  MDD, recurrent, severe; 4+ medication trials (from 2+ classes) ineffective; Psychotherapy ineffective.     Pre-Procedure Diagnosis:  MDD, recurrent, severe 296.33    Treatment Hx:  Treatment number this series: 7  Total lifetime treatment number: 7    No Known Allergies   There were no vitals taken for this visit.    Pause for the Cause  Right patient:  Yes  Right procedure/correct coil:  Yes; rTMS; cpt 12001; H1 coil.   Earplugs in place:  Yes     Procedure  Patient was seated in procedure chair. Identity and procedure was verified. Ear plugs were placed in ears and patient-specific cap was placed on head and tightened appropriately. Ruler locations were placed on head per  specifications. Coil was placed at  0-7-15.5 and stimulator was set to initial 50%.  Mapping pulses were delivered and response was quantified by visual inspection..  MT was found with specific location and energy detailed below. Coil was then placed at treatment location and stimulator was set to parameters below Given pt tolerance, 55 treatment trains were delivered. Pt tolerated procedure.  Motor Threshold Determination  Distance from nasion to inion:   MT 1: 0 - 7 - 15.5 @ 61% on 10/5/2021    Stimulation Parameters  Frequency: 18 Hz     Train duration: 2 sec  Total pulses delivered: 1980  Inter-train interval: 20 sec  Tx Loc: 0 - eye  - 15.5  Energy: 73% (120% MT)  Trains: 55 trains    Date MADRS IDS-SR PHQ-9   10/5/2021 24 29 17       Post-Procedure Diagnosis:  MDD, recurrent, severe  296.33    Zara Hurtado, TMS Technician  [facility]    Plan   - Cont TMS      I did not see the patient during/after treatment but remained available in the clinic during  treatment.    Liz Hampton MD  [facility]

## 2021-10-15 ENCOUNTER — OFFICE VISIT (OUTPATIENT)
Dept: PSYCHIATRY | Facility: CLINIC | Age: 41
End: 2021-10-15
Payer: MEDICARE

## 2021-10-15 DIAGNOSIS — F33.2 SEVERE EPISODE OF RECURRENT MAJOR DEPRESSIVE DISORDER, WITHOUT PSYCHOTIC FEATURES (H): Primary | ICD-10-CM

## 2021-10-15 ASSESSMENT — PATIENT HEALTH QUESTIONNAIRE - PHQ9: SUM OF ALL RESPONSES TO PHQ QUESTIONS 1-9: 7

## 2021-10-15 NOTE — PROGRESS NOTES
[facility] TMS Program  [address], [address]  [place] [address]  TMS Procedure Note   Srinivas Young MRN# [id number]  Age: 40 year old year old YOB: 1980    Pre-Procedure:  History and Physical: Reviewed in medical record  Consent Signed by: Srinivas Young  On: 10/5/21    Clinical Narrative:  Patient is tolerating treatment. Chose to wear mouth guard and use bone conducting headphones today. Patient was doing well today.     Indications for TMS:  MDD, recurrent, severe; 4+ medication trials (from 2+ classes) ineffective; Psychotherapy ineffective.     Pre-Procedure Diagnosis:  MDD, recurrent, severe 296.33    Treatment Hx:  Treatment number this series: 8  Total lifetime treatment number: 8    No Known Allergies   There were no vitals taken for this visit.    Pause for the Cause  Right patient:  Yes  Right procedure/correct coil:  Yes; rTMS; cpt 94272; H1 coil.   Earplugs in place:  Yes     Procedure  Patient was seated in procedure chair. Identity and procedure was verified. Ear plugs were placed in ears and patient-specific cap was placed on head and tightened appropriately. Ruler locations were placed on head per  specifications. Coil was placed at  0-7-15.5 and stimulator was set to initial 50%.  Mapping pulses were delivered and response was quantified by visual inspection..  MT was found with specific location and energy detailed below. Coil was then placed at treatment location and stimulator was set to parameters below Given pt tolerance, 55 treatment trains were delivered. Pt tolerated procedure.  Motor Threshold Determination  Distance from nasion to inion:   MT 1: 0 - 7 - 15.5 @ 61% on 10/5/2021    Stimulation Parameters  Frequency: 18 Hz     Train duration: 2 sec  Total pulses delivered: 1980  Inter-train interval: 20 sec  Tx Loc: 0 - eye  - 15.5  Energy: 73% (120% MT)  Trains: 55 trains    Date MADRS IDS-SR PHQ-9   10/5/2021 24 29 17   10/15/21  23 7        Post-Procedure Diagnosis:  MDD, recurrent, severe 296.33    Zara Hurtado, TMS Technician  [facility]    Plan   - Cont TMS      I did not see the patient during/after treatment but remained available in the clinic during  treatment.      Darnell Marrero MD  [facility]

## 2021-10-18 ENCOUNTER — OFFICE VISIT (OUTPATIENT)
Dept: PSYCHIATRY | Facility: CLINIC | Age: 41
End: 2021-10-18
Payer: MEDICARE

## 2021-10-18 DIAGNOSIS — F33.2 SEVERE EPISODE OF RECURRENT MAJOR DEPRESSIVE DISORDER, WITHOUT PSYCHOTIC FEATURES (H): Primary | ICD-10-CM

## 2021-10-18 NOTE — PROGRESS NOTES
[facility] TMS Program  [address], [address]  [place] [address]  TMS Procedure Note   Srinivas Young MRN# [id number]  Age: 40 year old year old YOB: 1980    Pre-Procedure:  History and Physical: Reviewed in medical record  Consent Signed by: Srinivas Young  On: 10/5/21    Clinical Narrative:  Patient is tolerating treatment. Chose to wear mouth guard and use bone conducting headphones today. Patient was doing well today.     Indications for TMS:  MDD, recurrent, severe; 4+ medication trials (from 2+ classes) ineffective; Psychotherapy ineffective.     Pre-Procedure Diagnosis:  MDD, recurrent, severe 296.33    Treatment Hx:  Treatment number this series: 9  Total lifetime treatment number: 9    No Known Allergies   There were no vitals taken for this visit.    Pause for the Cause  Right patient:  Yes  Right procedure/correct coil:  Yes; rTMS; cpt 29350; H1 coil.   Earplugs in place:  Yes     Procedure  Patient was seated in procedure chair. Identity and procedure was verified. Ear plugs were placed in ears and patient-specific cap was placed on head and tightened appropriately. Ruler locations were placed on head per  specifications. Coil was placed at  0-7-15.5 and stimulator was set to initial 50%.  Mapping pulses were delivered and response was quantified by visual inspection..  MT was found with specific location and energy detailed below. Coil was then placed at treatment location and stimulator was set to parameters below Given pt tolerance, 55 treatment trains were delivered. Pt tolerated procedure.  Motor Threshold Determination  Distance from nasion to inion:   MT 1: 0 - 7 - 15.5 @ 61% on 10/5/2021    Stimulation Parameters  Frequency: 18 Hz     Train duration: 2 sec  Total pulses delivered: 1980  Inter-train interval: 20 sec  Tx Loc: 0 - eye  - 15.5  Energy: 73% (120% MT)  Trains: 55 trains    Date MADRS IDS-SR PHQ-9   10/5/2021 24 29 17   10/15/21  23 7        Post-Procedure Diagnosis:  MDD, recurrent, severe 296.33    Zara Hurtado, TMS Technician  [facility]    Plan   - Cont TMS        Dayne Toro MD  [facility]

## 2021-10-19 ENCOUNTER — OFFICE VISIT (OUTPATIENT)
Dept: PSYCHIATRY | Facility: CLINIC | Age: 41
End: 2021-10-19
Payer: MEDICARE

## 2021-10-19 DIAGNOSIS — F33.2 SEVERE EPISODE OF RECURRENT MAJOR DEPRESSIVE DISORDER, WITHOUT PSYCHOTIC FEATURES (H): Primary | ICD-10-CM

## 2021-10-19 ASSESSMENT — PATIENT HEALTH QUESTIONNAIRE - PHQ9: SUM OF ALL RESPONSES TO PHQ QUESTIONS 1-9: 4

## 2021-10-19 NOTE — PROGRESS NOTES
[facility] TMS Program  [address], [address]  [place] [address]  TMS Procedure Note   Srinivas Young MRN# [id number]  Age: 40 year old year old YOB: 1980    Pre-Procedure:  History and Physical: Reviewed in medical record  Consent Signed by: Srinivas Young  On: 10/5/21    Clinical Narrative:  Patient is tolerating treatment. Chose to wear mouth guard and use bone conducting headphones today. Patient was doing well today.     Daily Adult Stimulation Safety Questionnaire: No or Yes in past 24 hours     1) Have you discontinued or started any new medication? No  2) Have you missed any doses of your medication differently then prescribed? No  3) Have you consumed alcohol or drugs (other than prescribed)?  No  4) Did you not sleep AT ALL last night?  No  5) Has your SI changed or worsened?  No      Indications for TMS:  MDD, recurrent, severe; 4+ medication trials (from 2+ classes) ineffective; Psychotherapy ineffective.     Pre-Procedure Diagnosis:  MDD, recurrent, severe 296.33    Treatment Hx:  Treatment number this series: 10  Total lifetime treatment number: 10    No Known Allergies   There were no vitals taken for this visit.       Pause for the Cause  Right patient:  Yes  Right procedure/correct coil:  Yes; rTMS; cpt 89635; H1 coil.   Earplugs in place:  Yes    Procedure  Patient was seated in procedure chair. Identity and procedure was verified. Ear plugs were placed in ears and patient-specific cap was placed on head and tightened appropriately. Ruler locations were verified. Coil was placed at treatment location and stimulator was set to parameters described below. A test train was delivered and pt tolerated train. Given pt tolerance, 55 treatment trains were delivered. Pt tolerated procedure with right facial movement.    Motor Threshold Determination  Distance from nasion to inion:   MT 1: 0 - 7 - 15.5 @ 61% on 10/5/2021    Stimulation Parameters  Frequency: 18 Hz     Train duration:  2 sec  Total pulses delivered: 1980  Inter-train interval: 20 sec  Tx Loc: 0 - eye  - 15.5  Energy: 73% (120% MT)  Trains: 55 trains    Date MADRS IDS-SR PHQ-9   10/5/2021 24 29 17   10/15/21  23 7       Post-Procedure Diagnosis:  MDD, recurrent, severe 296.33    Eden Loza, Psychometrist  [facility]    Plan   - Cont TMS      I did not see the patient during/after treatment but remained available in the clinic during  treatment.    Liz Hampton MD  [facility]

## 2021-10-20 ENCOUNTER — OFFICE VISIT (OUTPATIENT)
Dept: PSYCHIATRY | Facility: CLINIC | Age: 41
End: 2021-10-20
Payer: MEDICARE

## 2021-10-20 DIAGNOSIS — F33.2 SEVERE EPISODE OF RECURRENT MAJOR DEPRESSIVE DISORDER, WITHOUT PSYCHOTIC FEATURES (H): Primary | ICD-10-CM

## 2021-10-20 ASSESSMENT — PATIENT HEALTH QUESTIONNAIRE - PHQ9: SUM OF ALL RESPONSES TO PHQ QUESTIONS 1-9: 7

## 2021-10-20 NOTE — PROGRESS NOTES
[facility] TMS Program  [address], [address]  [place] [address]  TMS Procedure Note   Srinivas Young MRN# [id number]  Age: 40 year old year old YOB: 1980    Pre-Procedure:  History and Physical: Reviewed in medical record  Consent Signed by: Srinivas Young  On: 10/5/21    Clinical Narrative:  Patient is tolerating treatment. Chose to wear mouth guard and read today. New job is going well.    Daily Adult Stimulation Safety Questionnaire: No or Yes in past 24 hours     1) Have you discontinued or started any new medication? No  2) Have you missed any doses of your medication differently then prescribed? No  3) Have you consumed alcohol or drugs (other than prescribed)?  No  4) Did you not sleep AT ALL last night?  No  5) Has your SI changed or worsened?  No      Indications for TMS:  MDD, recurrent, severe; 4+ medication trials (from 2+ classes) ineffective; Psychotherapy ineffective.     Pre-Procedure Diagnosis:  MDD, recurrent, severe 296.33    Treatment Hx:  Treatment number this series: 11  Total lifetime treatment number: 11    No Known Allergies   There were no vitals taken for this visit.       Pause for the Cause  Right patient:  Yes  Right procedure/correct coil:  Yes; rTMS; cpt 31405; H1 coil.   Earplugs in place:  Yes    Procedure  Patient was seated in procedure chair. Identity and procedure was verified. Ear plugs were placed in ears and patient-specific cap was placed on head and tightened appropriately. Ruler locations were verified. Coil was placed at treatment location and stimulator was set to parameters described below. A test train was delivered and pt tolerated train. Given pt tolerance, 55 treatment trains were delivered. Pt tolerated procedure with right facial movement.    Motor Threshold Determination  Distance from nasion to inion:   MT 1: 0 - 7 - 15.5 @ 61% on 10/5/2021    Stimulation Parameters  Frequency: 18 Hz     Train duration: 2 sec  Total pulses delivered:  1980  Inter-train interval: 20 sec  Tx Loc: 0 - eye  - 15.5  Energy: 73% (120% MT)  Trains: 55 trains    Date MADRS IDS-SR PHQ-9   10/5/2021 24 29 17   10/15/21  23 7       Post-Procedure Diagnosis:  MDD, recurrent, severe 296.33    Zara Hurtado, TMS Technician  [facility]    Plan   - Cont TMS    I did not see the patient but remained available in the clinic throughout the TMS session.     Maximo Moeller M.D.   [facility]

## 2021-10-21 ENCOUNTER — OFFICE VISIT (OUTPATIENT)
Dept: PSYCHIATRY | Facility: CLINIC | Age: 41
End: 2021-10-21
Payer: MEDICARE

## 2021-10-21 DIAGNOSIS — F33.2 SEVERE EPISODE OF RECURRENT MAJOR DEPRESSIVE DISORDER, WITHOUT PSYCHOTIC FEATURES (H): Primary | ICD-10-CM

## 2021-10-21 ASSESSMENT — PATIENT HEALTH QUESTIONNAIRE - PHQ9: SUM OF ALL RESPONSES TO PHQ QUESTIONS 1-9: 7

## 2021-10-21 NOTE — PROGRESS NOTES
[facility] TMS Program  [address], [address]  [place] [address]  TMS Procedure Note   Srinivas Young MRN# [id number]  Age: 40 year old year old YOB: 1980    Pre-Procedure:  History and Physical: Reviewed in medical record  Consent Signed by: Srinivas Young  On: 10/5/21    Clinical Narrative:  Patient is tolerating treatment. Chose to wear mouth guard and used boneconducting headphones today. Had a nice birthday dinner.    Daily Adult Stimulation Safety Questionnaire: No or Yes in past 24 hours     1) Have you discontinued or started any new medication? No  2) Have you missed any doses of your medication differently then prescribed? No  3) Have you consumed alcohol or drugs (other than prescribed)?  No  4) Did you not sleep AT ALL last night?  No  5) Has your SI changed or worsened?  No      Indications for TMS:  MDD, recurrent, severe; 4+ medication trials (from 2+ classes) ineffective; Psychotherapy ineffective.     Pre-Procedure Diagnosis:  MDD, recurrent, severe 296.33    Treatment Hx:  Treatment number this series: 12  Total lifetime treatment number: 12    No Known Allergies   There were no vitals taken for this visit.       Pause for the Cause  Right patient:  Yes  Right procedure/correct coil:  Yes; rTMS; cpt 56582; H1 coil.   Earplugs in place:  Yes    Procedure  Patient was seated in procedure chair. Identity and procedure was verified. Ear plugs were placed in ears and patient-specific cap was placed on head and tightened appropriately. Ruler locations were verified. Coil was placed at treatment location and stimulator was set to parameters described below. A test train was delivered and pt tolerated train. Given pt tolerance, 55 treatment trains were delivered. Pt tolerated procedure with right facial movement.    Motor Threshold Determination  Distance from nasion to inion:   MT 1: 0 - 7 - 15.5 @ 61% on 10/5/2021    Stimulation Parameters  Frequency: 18 Hz     Train duration: 2  sec  Total pulses delivered: 1980  Inter-train interval: 20 sec  Tx Loc: 0 - eye  - 15.5  Energy: 73% (120% MT)  Trains: 55 trains    Date MADRS IDS-SR PHQ-9   10/5/2021 24 29 17   10/15/21  23 7       Post-Procedure Diagnosis:  MDD, recurrent, severe 296.33    Zara Hurtado, TMS Technician  [facility]    Plan   - Cont TMS      I did not see the patient during/after treatment but remained available in the clinic during  treatment.    Liz Hampton MD  [facility]

## 2021-10-22 ENCOUNTER — OFFICE VISIT (OUTPATIENT)
Dept: PSYCHIATRY | Facility: CLINIC | Age: 41
End: 2021-10-22
Payer: MEDICARE

## 2021-10-22 DIAGNOSIS — F33.2 SEVERE EPISODE OF RECURRENT MAJOR DEPRESSIVE DISORDER, WITHOUT PSYCHOTIC FEATURES (H): Primary | ICD-10-CM

## 2021-10-22 ASSESSMENT — PATIENT HEALTH QUESTIONNAIRE - PHQ9: SUM OF ALL RESPONSES TO PHQ QUESTIONS 1-9: 8

## 2021-10-22 NOTE — PROGRESS NOTES
[facility] TMS Program  [address], [address]  [place] [address]  TMS Procedure Note   Srinivas Young MRN# [id number]  Age: 40 year old year old YOB: 1980    Pre-Procedure:  History and Physical: Reviewed in medical record  Consent Signed by: Srinivas Young  On: 10/5/21    Clinical Narrative:  Patient is tolerating treatment.  Patient reports that he has noticed improvement with TMS.      Daily Adult Stimulation Safety Questionnaire: No or Yes in past 24 hours     1) Have you discontinued or started any new medication? No  2) Have you missed any doses of your medication differently then prescribed? No  3) Have you consumed alcohol or drugs (other than prescribed)?  No  4) Did you not sleep AT ALL last night?  No  5) Has your SI changed or worsened?  No      Indications for TMS:  MDD, recurrent, severe; 4+ medication trials (from 2+ classes) ineffective; Psychotherapy ineffective.     Pre-Procedure Diagnosis:  MDD, recurrent, severe F33.2    Treatment Hx:  Treatment number this series: 13  Total lifetime treatment number: 13    No Known Allergies   There were no vitals taken for this visit.       Pause for the Cause  Right patient:  Yes  Right procedure/correct coil:  Yes; rTMS; cpt 07613; H1 coil.   Earplugs in place:  Yes    Procedure  Patient was seated in procedure chair. Identity and procedure was verified. Ear plugs were placed in ears and patient-specific cap was placed on head and tightened appropriately. Ruler locations were verified. Coil was placed at treatment location and stimulator was set to parameters described below. A test train was delivered and pt tolerated train. Given pt tolerance, 55 treatment trains were delivered. Pt tolerated procedure with right facial movement.    Motor Threshold Determination  Distance from nasion to inion:   MT 1: 0 - 7 - 15.5 @ 61% on 10/5/2021    Stimulation Parameters  Frequency: 18 Hz     Train duration: 2 sec  Total pulses delivered:  1980  Inter-train interval: 20 sec  Tx Loc: 0 - eye  - 15.5  Energy: 73% (120% MT)  Trains: 55 trains    Date MADRS IDS-SR PHQ-9   10/5/2021 24 29 17   10/15/21  23 7   10/22/21  18 8       Post-Procedure Diagnosis:  MDD, recurrent, severe F33.2    Bronwyn Mukherjee RN   [facility]    Plan   - Cont TMS      I did not see patient but remained available in the clinic throughout the TMS session.        Pilar Wynne MD  [facility]

## 2021-10-25 ENCOUNTER — OFFICE VISIT (OUTPATIENT)
Dept: PSYCHIATRY | Facility: CLINIC | Age: 41
End: 2021-10-25
Payer: MEDICARE

## 2021-10-25 DIAGNOSIS — F33.2 SEVERE EPISODE OF RECURRENT MAJOR DEPRESSIVE DISORDER, WITHOUT PSYCHOTIC FEATURES (H): Primary | ICD-10-CM

## 2021-10-25 ASSESSMENT — PATIENT HEALTH QUESTIONNAIRE - PHQ9: SUM OF ALL RESPONSES TO PHQ QUESTIONS 1-9: 8

## 2021-10-25 NOTE — PROGRESS NOTES
[facility] TMS Program  [address], [address]  [place] [address]  TMS Procedure Note   Srinivas Young MRN# [id number]  Age: 40 year old year old YOB: 1980    Pre-Procedure:  History and Physical: Reviewed in medical record  Consent Signed by: Srinivas Young  On: 10/5/21    Clinical Narrative:  Patient is tolerating treatment.  Patient had a so-so weekend. No changes reported.    Daily Adult Stimulation Safety Questionnaire: No or Yes in past 24 hours     1) Have you discontinued or started any new medication? No  2) Have you missed any doses of your medication differently then prescribed? No  3) Have you consumed alcohol or drugs (other than prescribed)?  No  4) Did you not sleep AT ALL last night?  No  5) Has your SI changed or worsened?  No      Indications for TMS:  MDD, recurrent, severe; 4+ medication trials (from 2+ classes) ineffective; Psychotherapy ineffective.     Pre-Procedure Diagnosis:  MDD, recurrent, severe F33.2    Treatment Hx:  Treatment number this series: 14  Total lifetime treatment number: 14    No Known Allergies   There were no vitals taken for this visit.       Pause for the Cause  Right patient:  Yes  Right procedure/correct coil:  Yes; rTMS; cpt 27540; H1 coil.   Earplugs in place:  Yes    Procedure  Patient was seated in procedure chair. Identity and procedure was verified. Ear plugs were placed in ears and patient-specific cap was placed on head and tightened appropriately. Ruler locations were verified. Coil was placed at treatment location and stimulator was set to parameters described below. A test train was delivered and pt tolerated train. Given pt tolerance, 55 treatment trains were delivered. Pt tolerated procedure with right facial movement.    Motor Threshold Determination  Distance from nasion to inion:   MT 1: 0 - 7 - 15.5 @ 61% on 10/5/2021    Stimulation Parameters  Frequency: 18 Hz     Train duration: 2 sec  Total pulses delivered: 1980  Inter-train  interval: 20 sec  Tx Loc: 0 - eye  - 15.5  Energy: 73% (120% MT)  Trains: 55 trains    Date MADRS IDS-SR PHQ-9   10/5/2021 24 29 17   10/15/21  23 7   10/22/21  18 8       Post-Procedure Diagnosis:  MDD, recurrent, severe F33.2    Zara Hurtado, TMS Technician  [facility]    Plan   - Cont TMS    I did not see patient but remained available in the clinic throughout the TMS session.      Pilar Wynne MD  [facility]

## 2021-10-26 ENCOUNTER — OFFICE VISIT (OUTPATIENT)
Dept: PSYCHIATRY | Facility: CLINIC | Age: 41
End: 2021-10-26
Payer: MEDICARE

## 2021-10-26 DIAGNOSIS — F33.2 SEVERE EPISODE OF RECURRENT MAJOR DEPRESSIVE DISORDER, WITHOUT PSYCHOTIC FEATURES (H): Primary | ICD-10-CM

## 2021-10-26 ASSESSMENT — PATIENT HEALTH QUESTIONNAIRE - PHQ9: SUM OF ALL RESPONSES TO PHQ QUESTIONS 1-9: 8

## 2021-10-26 NOTE — PROGRESS NOTES
[facility] TMS Program  [address], [address]  [place] [address]  TMS Procedure Note   Srinivas Young MRN# [id number]  Age: 40 year old year old YOB: 1980    Pre-Procedure:  History and Physical: Reviewed in medical record  Consent Signed by: Srinivas Young  On: 10/5/21    Clinical Narrative:  Patient is tolerating treatment.  Patient was doing well today. No changes reported.    Daily Adult Stimulation Safety Questionnaire: No or Yes in past 24 hours     1) Have you discontinued or started any new medication? No  2) Have you missed any doses of your medication differently then prescribed? No  3) Have you consumed alcohol or drugs (other than prescribed)?  No  4) Did you not sleep AT ALL last night?  No  5) Has your SI changed or worsened?  No      Indications for TMS:  MDD, recurrent, severe; 4+ medication trials (from 2+ classes) ineffective; Psychotherapy ineffective.     Pre-Procedure Diagnosis:  MDD, recurrent, severe F33.2    Treatment Hx:  Treatment number this series: 15  Total lifetime treatment number: 15    No Known Allergies   There were no vitals taken for this visit.       Pause for the Cause  Right patient:  Yes  Right procedure/correct coil:  Yes; rTMS; cpt 29625; H1 coil.   Earplugs in place:  Yes    Procedure  Patient was seated in procedure chair. Identity and procedure was verified. Ear plugs were placed in ears and patient-specific cap was placed on head and tightened appropriately. Ruler locations were verified. Coil was placed at treatment location and stimulator was set to parameters described below. A test train was delivered and pt tolerated train. Given pt tolerance, 55 treatment trains were delivered. Pt tolerated procedure with right facial movement.    Motor Threshold Determination  Distance from nasion to inion:   MT 1: 0 - 7 - 15.5 @ 61% on 10/5/2021    Stimulation Parameters  Frequency: 18 Hz     Train duration: 2 sec  Total pulses delivered:  1980  Inter-train interval: 20 sec  Tx Loc: 0 - eye  - 15.5  Energy: 73% (120% MT)  Trains: 55 trains    Date MADRS IDS-SR PHQ-9   10/5/2021 24 29 17   10/15/21  23 7   10/22/21  18 8       Post-Procedure Diagnosis:  MDD, recurrent, severe F33.2    Zara Hurtado, TMS Technician  [facility]    Plan   - Cont TMS    I did not see the patient during/after treatment but remained available in the clinic during  treatment.    Liz Hampton MD  [facility]

## 2021-10-27 ENCOUNTER — OFFICE VISIT (OUTPATIENT)
Dept: PSYCHIATRY | Facility: CLINIC | Age: 41
End: 2021-10-27
Payer: MEDICARE

## 2021-10-27 DIAGNOSIS — F33.2 SEVERE EPISODE OF RECURRENT MAJOR DEPRESSIVE DISORDER, WITHOUT PSYCHOTIC FEATURES (H): Primary | ICD-10-CM

## 2021-10-27 ASSESSMENT — PATIENT HEALTH QUESTIONNAIRE - PHQ9: SUM OF ALL RESPONSES TO PHQ QUESTIONS 1-9: 8

## 2021-10-27 NOTE — PROGRESS NOTES
[facility] TMS Program  [address], [address]  [place] [address]  TMS Procedure Note   Srinivas Young MRN# [id number]  Age: 40 year old year old YOB: 1980    Pre-Procedure:  History and Physical: Reviewed in medical record  Consent Signed by: Srinivas Young  On: 10/5/21    Clinical Narrative:  Patient is tolerating treatment.  Patient was doing well today. No changes reported.    Daily Adult Stimulation Safety Questionnaire: No or Yes in past 24 hours     1) Have you discontinued or started any new medication? No  2) Have you missed any doses of your medication differently then prescribed? No  3) Have you consumed alcohol or drugs (other than prescribed)?  No  4) Did you not sleep AT ALL last night?  No  5) Has your SI changed or worsened?  No      Indications for TMS:  MDD, recurrent, severe; 4+ medication trials (from 2+ classes) ineffective; Psychotherapy ineffective.     Pre-Procedure Diagnosis:  MDD, recurrent, severe F33.2    Treatment Hx:  Treatment number this series: 16  Total lifetime treatment number: 16    No Known Allergies   There were no vitals taken for this visit.       Pause for the Cause  Right patient:  Yes  Right procedure/correct coil:  Yes; rTMS; cpt 23895; H1 coil.   Earplugs in place:  Yes    Procedure  Patient was seated in procedure chair. Identity and procedure was verified. Ear plugs were placed in ears and patient-specific cap was placed on head and tightened appropriately. Ruler locations were verified. Coil was placed at treatment location and stimulator was set to parameters described below. A test train was delivered and pt tolerated train. Given pt tolerance, 55 treatment trains were delivered. Pt tolerated procedure with right facial movement.    Motor Threshold Determination  Distance from nasion to inion:   MT 1: 0 - 7 - 15.5 @ 61% on 10/5/2021    Stimulation Parameters  Frequency: 18 Hz     Train duration: 2 sec  Total pulses delivered:  1980  Inter-train interval: 20 sec  Tx Loc: 0 - eye  - 15.5  Energy: 73% (120% MT)  Trains: 55 trains    Date MADRS IDS-SR PHQ-9   10/5/2021 24 29 17   10/15/21  23 7   10/22/21  18 8       Post-Procedure Diagnosis:  MDD, recurrent, severe F33.2    Zara Hurtado, TMS Technician  [facility]    Plan   - Cont TMS    I did not see the patient but remained available in clinic throughout treatment.     London Shelton MD  [facility]

## 2021-10-28 ENCOUNTER — OFFICE VISIT (OUTPATIENT)
Dept: PSYCHIATRY | Facility: CLINIC | Age: 41
End: 2021-10-28
Payer: MEDICARE

## 2021-10-28 DIAGNOSIS — F33.2 SEVERE EPISODE OF RECURRENT MAJOR DEPRESSIVE DISORDER, WITHOUT PSYCHOTIC FEATURES (H): Primary | ICD-10-CM

## 2021-10-28 ASSESSMENT — PATIENT HEALTH QUESTIONNAIRE - PHQ9: SUM OF ALL RESPONSES TO PHQ QUESTIONS 1-9: 10

## 2021-10-28 NOTE — PROGRESS NOTES
[facility] TMS Program  [address], [address]  [place] [address]  TMS Procedure Note   Srinivas Young MRN# [id number]  Age: 40 year old year old YOB: 1980    Pre-Procedure:  History and Physical: Reviewed in medical record  Consent Signed by: Srinivas Young  On: 10/5/21    Clinical Narrative:  Patient is tolerating treatment.  Patient reports he woke with a migraine.  Reduced sleep due to this, but 5 hours.    Daily Adult Stimulation Safety Questionnaire: No or Yes in past 24 hours     1) Have you discontinued or started any new medication? No  2) Have you missed any doses of your medication differently then prescribed? No  3) Have you consumed alcohol or drugs (other than prescribed)?  No  4) Did you not sleep AT ALL last night?  No  5) Has your SI changed or worsened?  No      Indications for TMS:  MDD, recurrent, severe; 4+ medication trials (from 2+ classes) ineffective; Psychotherapy ineffective.     Pre-Procedure Diagnosis:  MDD, recurrent, severe F33.2    Treatment Hx:  Treatment number this series: 17  Total lifetime treatment number: 17    No Known Allergies   There were no vitals taken for this visit.       Pause for the Cause  Right patient:  Yes  Right procedure/correct coil:  Yes; rTMS; cpt 98143; H1 coil.   Earplugs in place:  Yes    Procedure  Patient was seated in procedure chair. Identity and procedure was verified. Ear plugs were placed in ears and patient-specific cap was placed on head and tightened appropriately. Ruler locations were verified. Coil was placed at treatment location and stimulator was set to parameters described below. A test train was delivered and pt tolerated train. Given pt tolerance, 55 treatment trains were delivered. Pt tolerated procedure with right facial movement.    Motor Threshold Determination  Distance from nasion to inion:   MT 1: 0 - 7 - 15.5 @ 61% on 10/5/2021    Stimulation Parameters  Frequency: 18 Hz     Train duration: 2 sec  Total  pulses delivered: 1980  Inter-train interval: 20 sec  Tx Loc: 0 - eye  - 15.5  Energy: 73% (120% MT)  Trains: 55 trains    Date MADRS IDS-SR PHQ-9   10/5/2021 24 29 17   10/15/21  23 7   10/22/21  18 8       Post-Procedure Diagnosis:  MDD, recurrent, severe F33.2    Eden Loza, Psychometrist  [facility]    Plan   - Cont TMS      I did not see the patient during/after treatment but remained available in the clinic during  treatment.    Liz Hampton MD  [facility]

## 2021-10-29 ENCOUNTER — OFFICE VISIT (OUTPATIENT)
Dept: PSYCHIATRY | Facility: CLINIC | Age: 41
End: 2021-10-29
Payer: MEDICARE

## 2021-10-29 DIAGNOSIS — F33.2 SEVERE EPISODE OF RECURRENT MAJOR DEPRESSIVE DISORDER, WITHOUT PSYCHOTIC FEATURES (H): Primary | ICD-10-CM

## 2021-10-29 ASSESSMENT — PATIENT HEALTH QUESTIONNAIRE - PHQ9: SUM OF ALL RESPONSES TO PHQ QUESTIONS 1-9: 9

## 2021-10-29 NOTE — PROGRESS NOTES
[facility] TMS Program  [address], [address]  [place] [address]  TMS Procedure Note   Srinivas Young MRN# [id number]  Age: 40 year old year old YOB: 1980    Pre-Procedure:  History and Physical: Reviewed in medical record  Consent Signed by: Srinivas Young  On: 10/5/21    Clinical Narrative:  Patient is tolerating treatment.  Patient reports no changes.    Daily Adult Stimulation Safety Questionnaire: No or Yes in past 24 hours     1) Have you discontinued or started any new medication? No  2) Have you missed any doses of your medication differently then prescribed? No  3) Have you consumed alcohol or drugs (other than prescribed)?  No  4) Did you not sleep AT ALL last night?  No  5) Has your SI changed or worsened?  No      Indications for TMS:  MDD, recurrent, severe; 4+ medication trials (from 2+ classes) ineffective; Psychotherapy ineffective.     Pre-Procedure Diagnosis:  MDD, recurrent, severe F33.2    Treatment Hx:  Treatment number this series: 18  Total lifetime treatment number: 18    No Known Allergies   There were no vitals taken for this visit.       Pause for the Cause  Right patient:  Yes  Right procedure/correct coil:  Yes; rTMS; cpt 47588; H1 coil.   Earplugs in place:  Yes    Procedure  Patient was seated in procedure chair. Identity and procedure was verified. Ear plugs were placed in ears and patient-specific cap was placed on head and tightened appropriately. Ruler locations were verified. Coil was placed at treatment location and stimulator was set to parameters described below. A test train was delivered and pt tolerated train. Given pt tolerance, 55 treatment trains were delivered. Pt tolerated procedure with right facial movement.    Motor Threshold Determination  Distance from nasion to inion:   MT 1: 0 - 7 - 15.5 @ 61% on 10/5/2021    Stimulation Parameters  Frequency: 18 Hz     Train duration: 2 sec  Total pulses delivered: 1980  Inter-train interval: 20 sec  Tx  Loc: 0 - eye  - 15.5  Energy: 73% (120% MT)  Trains: 55 trains    Date MADRS IDS-SR PHQ-9   10/5/2021 24 29 17   10/15/21  23 7   10/22/21  18 8   10/29/21  16 9                   Post-Procedure Diagnosis:  MDD, recurrent, severe F33.2    Eden Loza, Psychometrist  [facility]    Plan   - Cont TMS    I did not see the patient during/after treatment but remained available in the clinic during  treatment.      Darnell Marrero MD PhD  [facility]  [facility]

## 2021-11-01 ENCOUNTER — OFFICE VISIT (OUTPATIENT)
Dept: PSYCHIATRY | Facility: CLINIC | Age: 41
End: 2021-11-01
Payer: MEDICARE

## 2021-11-01 DIAGNOSIS — F33.2 SEVERE EPISODE OF RECURRENT MAJOR DEPRESSIVE DISORDER, WITHOUT PSYCHOTIC FEATURES (H): Primary | ICD-10-CM

## 2021-11-01 ASSESSMENT — PATIENT HEALTH QUESTIONNAIRE - PHQ9: SUM OF ALL RESPONSES TO PHQ QUESTIONS 1-9: 8

## 2021-11-01 NOTE — PROGRESS NOTES
[facility] TMS Program  [address], [address]  [place] [address]  TMS Procedure Note   Srinivas Young MRN# [id number]  Age: 40 year old year old YOB: 1980    Pre-Procedure:  History and Physical: Reviewed in medical record  Consent Signed by: Srinivas Young  On: 10/5/21    Clinical Narrative:  Patient is tolerating treatment.  Patient reports no changes.    Daily Adult Stimulation Safety Questionnaire: No or Yes in past 24 hours     1) Have you discontinued or started any new medication? No  2) Have you missed any doses of your medication differently then prescribed? No  3) Have you consumed alcohol or drugs (other than prescribed)?  No  4) Did you not sleep AT ALL last night?  No  5) Has your SI changed or worsened?  No      Indications for TMS:  MDD, recurrent, severe; 4+ medication trials (from 2+ classes) ineffective; Psychotherapy ineffective.     Pre-Procedure Diagnosis:  MDD, recurrent, severe F33.2    Treatment Hx:  Treatment number this series: 19  Total lifetime treatment number: 19    No Known Allergies   There were no vitals taken for this visit.       Pause for the Cause  Right patient:  Yes  Right procedure/correct coil:  Yes; rTMS; cpt 87556; H1 coil.   Earplugs in place:  Yes    Procedure  Patient was seated in procedure chair. Identity and procedure was verified. Ear plugs were placed in ears and patient-specific cap was placed on head and tightened appropriately. Ruler locations were verified. Coil was placed at treatment location and stimulator was set to parameters described below. A test train was delivered and pt tolerated train. Given pt tolerance, 55 treatment trains were delivered. Pt tolerated procedure with right facial movement.    Motor Threshold Determination  Distance from nasion to inion:   MT 1: 0 - 7 - 15.5 @ 61% on 10/5/2021    Stimulation Parameters  Frequency: 18 Hz     Train duration: 2 sec  Total pulses delivered: 1980  Inter-train interval: 20 sec  Tx  Loc: 0 - eye  - 15.5  Energy: 73% (120% MT)  Trains: 55 trains    Date MADRS IDS-SR PHQ-9   10/5/2021 24 29 17   10/15/21  23 7   10/22/21  18 8   10/29/21  16 9                   Post-Procedure Diagnosis:  MDD, recurrent, severe F33.2    Laura Hernández, TMS Technician  [facility]    Plan   - Cont TMS    I did not see patient but remained available in the clinic throughout the TMS session.        Pilar Wynne MD   [facility]

## 2021-11-02 ENCOUNTER — TELEPHONE (OUTPATIENT)
Dept: PSYCHIATRY | Facility: CLINIC | Age: 41
End: 2021-11-02

## 2021-11-02 ENCOUNTER — OFFICE VISIT (OUTPATIENT)
Dept: PSYCHIATRY | Facility: CLINIC | Age: 41
End: 2021-11-02
Payer: MEDICARE

## 2021-11-02 DIAGNOSIS — F33.2 SEVERE EPISODE OF RECURRENT MAJOR DEPRESSIVE DISORDER, WITHOUT PSYCHOTIC FEATURES (H): Primary | ICD-10-CM

## 2021-11-02 ASSESSMENT — PATIENT HEALTH QUESTIONNAIRE - PHQ9: SUM OF ALL RESPONSES TO PHQ QUESTIONS 1-9: 9

## 2021-11-02 NOTE — TELEPHONE ENCOUNTER
What is the concern that needs to be addressed by a nurse? Patient is get a sleep apnea device implanted and wanted to check with Doctor if there is any concern for that device and doing TMS?     May a detailed message be left on voicemail? Yes    Date of last office visit: 11/2/21    Message routed to: [facility] RN pool

## 2021-11-02 NOTE — PROGRESS NOTES
[facility] TMS Program  [address], [address]  [place] [address]  TMS Procedure Note   Srinivas Young MRN# [id number]  Age: 40 year old year old YOB: 1980    Pre-Procedure:  History and Physical: Reviewed in medical record  Consent Signed by: Srinivas Young  On: 10/5/21    Clinical Narrative:  Patient is tolerating treatment.  Patient reports no changes.    Daily Adult Stimulation Safety Questionnaire: No or Yes in past 24 hours     1) Have you discontinued or started any new medication? No  2) Have you missed any doses of your medication differently then prescribed? No  3) Have you consumed alcohol or drugs (other than prescribed)?  No  4) Did you not sleep AT ALL last night?  No  5) Has your SI changed or worsened?  No      Indications for TMS:  MDD, recurrent, severe; 4+ medication trials (from 2+ classes) ineffective; Psychotherapy ineffective.     Pre-Procedure Diagnosis:  MDD, recurrent, severe F33.2    Treatment Hx:  Treatment number this series: 20  Total lifetime treatment number: 20    No Known Allergies   There were no vitals taken for this visit.       Pause for the Cause  Right patient:  Yes  Right procedure/correct coil:  Yes; rTMS; cpt 13816; H1 coil.   Earplugs in place:  Yes    Procedure  Patient was seated in procedure chair. Identity and procedure was verified. Ear plugs were placed in ears and patient-specific cap was placed on head and tightened appropriately. Ruler locations were verified. Coil was placed at treatment location and stimulator was set to parameters described below. A test train was delivered and pt tolerated train. Given pt tolerance, 55 treatment trains were delivered. Pt tolerated procedure with right facial movement.    Motor Threshold Determination  Distance from nasion to inion:   MT 1: 0 - 7 - 15.5 @ 61% on 10/5/2021    Stimulation Parameters  Frequency: 18 Hz     Train duration: 2 sec  Total pulses delivered: 1980  Inter-train interval: 20 sec  Tx  Loc: 0 - eye  - 15.5  Energy: 73% (120% MT)  Trains: 55 trains    Date MADRS IDS-SR PHQ-9   10/5/2021 24 29 17   10/15/21  23 7   10/22/21  18 8   10/29/21  16 9                   Post-Procedure Diagnosis:  MDD, recurrent, severe F33.2    Kiana Carmen CMA  [facility]  [facility]    Plan   - Cont TMS      I did not see the patient during/after treatment but remained available in the clinic during  treatment.    Liz Hampton MD  [facility]

## 2021-11-03 ENCOUNTER — OFFICE VISIT (OUTPATIENT)
Dept: PSYCHIATRY | Facility: CLINIC | Age: 41
End: 2021-11-03
Payer: MEDICARE

## 2021-11-03 DIAGNOSIS — F33.2 SEVERE EPISODE OF RECURRENT MAJOR DEPRESSIVE DISORDER, WITHOUT PSYCHOTIC FEATURES (H): Primary | ICD-10-CM

## 2021-11-03 ASSESSMENT — PATIENT HEALTH QUESTIONNAIRE - PHQ9: SUM OF ALL RESPONSES TO PHQ QUESTIONS 1-9: 5

## 2021-11-03 NOTE — PROGRESS NOTES
[facility] TMS Program  [address], [address]  [place] [address]  TMS Procedure Note   Srinivas Young MRN# [id number]  Age: 40 year old year old YOB: 1980    Pre-Procedure:  History and Physical: Reviewed in medical record  Consent Signed by: Srinivas Young  On: 10/5/21    Clinical Narrative:  Patient is tolerating treatment.  Patient reports no changes.    Daily Adult Stimulation Safety Questionnaire: No or Yes in past 24 hours     1) Have you discontinued or started any new medication? No  2) Have you missed any doses of your medication differently then prescribed? No  3) Have you consumed alcohol or drugs (other than prescribed)?  No  4) Did you not sleep AT ALL last night?  No  5) Has your SI changed or worsened?  No      Indications for TMS:  MDD, recurrent, severe; 4+ medication trials (from 2+ classes) ineffective; Psychotherapy ineffective.     Pre-Procedure Diagnosis:  MDD, recurrent, severe F33.2    Treatment Hx:  Treatment number this series: 21  Total lifetime treatment number: 21    No Known Allergies   There were no vitals taken for this visit.       Pause for the Cause  Right patient:  Yes  Right procedure/correct coil:  Yes; rTMS; cpt 48649; H1 coil.   Earplugs in place:  Yes    Procedure  Patient was seated in procedure chair. Identity and procedure was verified. Ear plugs were placed in ears and patient-specific cap was placed on head and tightened appropriately. Ruler locations were verified. Coil was placed at treatment location and stimulator was set to parameters described below. A test train was delivered and pt tolerated train. Given pt tolerance, 55 treatment trains were delivered. Pt tolerated procedure with right facial movement.    Motor Threshold Determination  Distance from nasion to inion:   MT 1: 0 - 7 - 15.5 @ 61% on 10/5/2021    Stimulation Parameters  Frequency: 18 Hz     Train duration: 2 sec  Total pulses delivered: 1980  Inter-train interval: 20 sec  Tx  Loc: 0 - eye  - 15.5  Energy: 73% (120% MT)  Trains: 55 trains    Date MADRS IDS-SR PHQ-9   10/5/2021 24 29 17   10/15/21  23 7   10/22/21  18 8   10/29/21  16 9                   Post-Procedure Diagnosis:  MDD, recurrent, severe F33.2    Kiana Carmen CMA  [facility]  Mental [facility]    Plan   - Cont TMS    I did not see the patient but remained available in clinic throughout treatment.      London Shelton MD   [facility]

## 2021-11-04 ENCOUNTER — OFFICE VISIT (OUTPATIENT)
Dept: PSYCHIATRY | Facility: CLINIC | Age: 41
End: 2021-11-04
Payer: MEDICARE

## 2021-11-04 DIAGNOSIS — F33.2 SEVERE EPISODE OF RECURRENT MAJOR DEPRESSIVE DISORDER, WITHOUT PSYCHOTIC FEATURES (H): Primary | ICD-10-CM

## 2021-11-04 ASSESSMENT — PATIENT HEALTH QUESTIONNAIRE - PHQ9: SUM OF ALL RESPONSES TO PHQ QUESTIONS 1-9: 9

## 2021-11-04 NOTE — PROGRESS NOTES
[facility] TMS Program  [address], [address]  [place] [address]  TMS Procedure Note   Srinivas Young MRN# [id number]  Age: 40 year old year old YOB: 1980    Pre-Procedure:  History and Physical: Reviewed in medical record  Consent Signed by: Srinivas Young  On: 10/5/21    Clinical Narrative:  Patient is tolerating treatment.  Patient reports no changes.    Daily Adult Stimulation Safety Questionnaire: No or Yes in past 24 hours     1) Have you discontinued or started any new medication? No  2) Have you missed any doses of your medication differently then prescribed? No  3) Have you consumed alcohol or drugs (other than prescribed)?  No  4) Did you not sleep AT ALL last night?  No  5) Has your SI changed or worsened?  No      Indications for TMS:  MDD, recurrent, severe; 4+ medication trials (from 2+ classes) ineffective; Psychotherapy ineffective.     Pre-Procedure Diagnosis:  MDD, recurrent, severe F33.2    Treatment Hx:  Treatment number this series: 22  Total lifetime treatment number: 22    No Known Allergies   There were no vitals taken for this visit.       Pause for the Cause  Right patient:  Yes  Right procedure/correct coil:  Yes; rTMS; cpt 42654; H1 coil.   Earplugs in place:  Yes    Procedure  Patient was seated in procedure chair. Identity and procedure was verified. Ear plugs were placed in ears and patient-specific cap was placed on head and tightened appropriately. Ruler locations were verified. Coil was placed at treatment location and stimulator was set to parameters described below. A test train was delivered and pt tolerated train. Given pt tolerance, 55 treatment trains were delivered. Pt tolerated procedure with right facial movement.    Motor Threshold Determination  Distance from nasion to inion:   MT 1: 0 - 7 - 15.5 @ 61% on 10/5/2021    Stimulation Parameters  Frequency: 18 Hz     Train duration: 2 sec  Total pulses delivered: 1980  Inter-train interval: 20 sec  Tx  Loc: 0 - eye  - 15.5  Energy: 73% (120% MT)  Trains: 55 trains    Date MADRS IDS-SR PHQ-9   10/5/2021 24 29 17   10/15/21  23 7   10/22/21  18 8   10/29/21  16 9                   Post-Procedure Diagnosis:  MDD, recurrent, severe F33.2    Laura Hernández, TMS Technician  [facility]    Plan   - Cont TMS    I did not see the patient during/after treatment but remained available in the clinic during  treatment.    Liz Hampton MD  [facility]

## 2021-11-04 NOTE — TELEPHONE ENCOUNTER
Patient possibly getting an Inspire implant to help aid in his sleep apnea. Will contact provider to see if equipment is safe to continue TMS with.     The inspire implant does not allow for MRI's to be completed because of the wiring, unsure if it would be acceptable with TMS.    Called thang and asked, Inspire implant is a contraindication of TMS, and is not safe for a patient to receive treatment after the implant is placed.    Writer informed patient, verbalized understanding.

## 2021-11-05 ENCOUNTER — OFFICE VISIT (OUTPATIENT)
Dept: PSYCHIATRY | Facility: CLINIC | Age: 41
End: 2021-11-05
Payer: MEDICARE

## 2021-11-05 DIAGNOSIS — F33.2 SEVERE EPISODE OF RECURRENT MAJOR DEPRESSIVE DISORDER, WITHOUT PSYCHOTIC FEATURES (H): Primary | ICD-10-CM

## 2021-11-05 ASSESSMENT — PATIENT HEALTH QUESTIONNAIRE - PHQ9: SUM OF ALL RESPONSES TO PHQ QUESTIONS 1-9: 9

## 2021-11-05 NOTE — PROGRESS NOTES
[facility] TMS Program  [address], [address]  [place] [address]  TMS Procedure Note   Srinivas Young MRN# [id number]  Age: 40 year old year old YOB: 1980    Pre-Procedure:  History and Physical: Reviewed in medical record  Consent Signed by: Srinivas Young  On: 10/5/21    Clinical Narrative:  Patient is tolerating treatment. Patient used bone conducting headphones and phone during treatment.  Patient reports no changes.    Daily Adult Stimulation Safety Questionnaire: No or Yes in past 24 hours     1) Have you discontinued or started any new medication? No  2) Have you missed any doses of your medication differently then prescribed? No  3) Have you consumed alcohol or drugs (other than prescribed)?  No  4) Did you not sleep AT ALL last night?  No  5) Has your SI changed or worsened?  No      Indications for TMS:  MDD, recurrent, severe; 4+ medication trials (from 2+ classes) ineffective; Psychotherapy ineffective.     Pre-Procedure Diagnosis:  MDD, recurrent, severe F33.2    Treatment Hx:  Treatment number this series: 23  Total lifetime treatment number: 23    No Known Allergies   There were no vitals taken for this visit.       Pause for the Cause  Right patient:  Yes  Right procedure/correct coil:  Yes; rTMS; cpt 30216; H1 coil.   Earplugs in place:  Yes    Procedure  Patient was seated in procedure chair. Identity and procedure was verified. Ear plugs were placed in ears and patient-specific cap was placed on head and tightened appropriately. Ruler locations were verified. Coil was placed at treatment location and stimulator was set to parameters described below. A test train was delivered and pt tolerated train. Given pt tolerance, 55 treatment trains were delivered. Pt tolerated procedure with right facial movement.    Motor Threshold Determination  Distance from nasion to inion:   MT 1: 0 - 7 - 15.5 @ 61% on 10/5/2021    Stimulation Parameters  Frequency: 18 Hz     Train duration: 2  sec  Total pulses delivered: 1980  Inter-train interval: 20 sec  Tx Loc: 0 - eye  - 15.5  Energy: 73% (120% MT)  Trains: 55 trains    Date MADRS IDS-SR PHQ-9   10/5/2021 24 29 17   10/15/21  23 7   10/22/21  18 8   10/29/21  16 9   11/5/21  17 9             Post-Procedure Diagnosis:  MDD, recurrent, severe F33.2    Laura Hernández, TMS Technician  [facility]    Plan   - Cont TMS    I did not see the patient during/after treatment but remained available in the clinic during  treatment.        Darnell De Luna MD   [facility]

## 2021-11-08 ENCOUNTER — OFFICE VISIT (OUTPATIENT)
Dept: PSYCHIATRY | Facility: CLINIC | Age: 41
End: 2021-11-08
Payer: MEDICARE

## 2021-11-08 DIAGNOSIS — F33.2 SEVERE EPISODE OF RECURRENT MAJOR DEPRESSIVE DISORDER, WITHOUT PSYCHOTIC FEATURES (H): Primary | ICD-10-CM

## 2021-11-08 NOTE — PROGRESS NOTES
[facility] TMS Program  [address], [address]  [place] [address]  TMS Procedure Note   Srinivas Young MRN# [id number]  Age: 40 year old year old YOB: 1980    Pre-Procedure:  History and Physical: Reviewed in medical record  Consent Signed by: Srinivas Young  On: 10/5/21    Clinical Narrative:  Patient is tolerating treatment. Patient used  phone during treatment.  Patient reports no changes.    Daily Adult Stimulation Safety Questionnaire: No or Yes in past 24 hours     1) Have you discontinued or started any new medication? No  2) Have you missed any doses of your medication differently then prescribed? No  3) Have you consumed alcohol or drugs (other than prescribed)?  No  4) Did you not sleep AT ALL last night?  No  5) Has your SI changed or worsened?  No      Indications for TMS:  MDD, recurrent, severe; 4+ medication trials (from 2+ classes) ineffective; Psychotherapy ineffective.     Pre-Procedure Diagnosis:  MDD, recurrent, severe F33.2    Treatment Hx:  Treatment number this series: 24  Total lifetime treatment number: 24    No Known Allergies   There were no vitals taken for this visit.       Pause for the Cause  Right patient:  Yes  Right procedure/correct coil:  Yes; rTMS; cpt 90588; H1 coil.   Earplugs in place:  Yes    Procedure  Patient was seated in procedure chair. Identity and procedure was verified. Ear plugs were placed in ears and patient-specific cap was placed on head and tightened appropriately. Ruler locations were verified. Coil was placed at treatment location and stimulator was set to parameters described below. A test train was delivered and pt tolerated train. Given pt tolerance, 55 treatment trains were delivered. Pt tolerated procedure with right facial movement.    Motor Threshold Determination  Distance from nasion to inion:   MT 1: 0 - 7 - 15.5 @ 61% on 10/5/2021    Stimulation Parameters  Frequency: 18 Hz     Train duration: 2 sec  Total pulses delivered:  1980  Inter-train interval: 20 sec  Tx Loc: 0 - eye  - 15.5  Energy: 73% (120% MT)  Trains: 55 trains    Date MADRS IDS-SR PHQ-9   10/5/2021 24 29 17   10/15/21  23 7   10/22/21  18 8   10/29/21  16 9   11/5/21  17 9             Post-Procedure Diagnosis:  MDD, recurrent, severe F33.2    Laura Hernández, TMS Technician  [facility]    Plan   - Cont TMS    I did not see patient but remained available in the clinic throughout the TMS session.        Pilar Wynne MD   [facility]

## 2021-11-09 ENCOUNTER — OFFICE VISIT (OUTPATIENT)
Dept: PSYCHIATRY | Facility: CLINIC | Age: 41
End: 2021-11-09
Payer: MEDICARE

## 2021-11-09 DIAGNOSIS — F33.2 SEVERE EPISODE OF RECURRENT MAJOR DEPRESSIVE DISORDER, WITHOUT PSYCHOTIC FEATURES (H): Primary | ICD-10-CM

## 2021-11-09 ASSESSMENT — PATIENT HEALTH QUESTIONNAIRE - PHQ9: SUM OF ALL RESPONSES TO PHQ QUESTIONS 1-9: 18

## 2021-11-09 NOTE — PROGRESS NOTES
[facility] TMS Program  [address], [address]  [place] [address]  TMS Procedure Note   Srinivas Young MRN# [id number]  Age: 40 year old year old YOB: 1980    Pre-Procedure:  History and Physical: Reviewed in medical record  Consent Signed by: Srinivas Young  On: 10/5/21    Clinical Narrative:  Patient is tolerating treatment. Patient used  phone and bone conducting headphones during treatment.  Patient reports no changes.    Daily Adult Stimulation Safety Questionnaire: No or Yes in past 24 hours     1) Have you discontinued or started any new medication? No  2) Have you missed any doses of your medication differently then prescribed? No  3) Have you consumed alcohol or drugs (other than prescribed)?  No  4) Did you not sleep AT ALL last night?  No  5) Has your SI changed or worsened?  No      Indications for TMS:  MDD, recurrent, severe; 4+ medication trials (from 2+ classes) ineffective; Psychotherapy ineffective.     Pre-Procedure Diagnosis:  MDD, recurrent, severe F33.2    Treatment Hx:  Treatment number this series: 25  Total lifetime treatment number: 25    No Known Allergies   There were no vitals taken for this visit.       Pause for the Cause  Right patient:  Yes  Right procedure/correct coil:  Yes; rTMS; cpt 34463; H1 coil.   Earplugs in place:  Yes    Procedure  Patient was seated in procedure chair. Identity and procedure was verified. Ear plugs were placed in ears and patient-specific cap was placed on head and tightened appropriately. Ruler locations were verified. Coil was placed at treatment location and stimulator was set to parameters described below. A test train was delivered and pt tolerated train. Given pt tolerance, 55 treatment trains were delivered. Pt tolerated procedure with right facial movement.    Motor Threshold Determination  Distance from nasion to inion:   MT 1: 0 - 7 - 15.5 @ 61% on 10/5/2021    Stimulation Parameters  Frequency: 18 Hz     Train duration: 2  sec  Total pulses delivered: 1980  Inter-train interval: 20 sec  Tx Loc: 0 - eye  - 15.5  Energy: 73% (120% MT)  Trains: 55 trains    Date MADRS IDS-SR PHQ-9   10/5/2021 24 29 17   10/15/21  23 7   10/22/21  18 8   10/29/21  16 9   11/5/21  17 9             Post-Procedure Diagnosis:  MDD, recurrent, severe F33.2    Laura Hernández, TMS Technician  [facility]    Plan   - Cont TMS    I did not see the patient during/after treatment but remained available in the clinic during  treatment.    Elliot Steward MD  [facility]

## 2021-11-10 ENCOUNTER — OFFICE VISIT (OUTPATIENT)
Dept: PSYCHIATRY | Facility: CLINIC | Age: 41
End: 2021-11-10
Payer: MEDICARE

## 2021-11-10 DIAGNOSIS — F33.2 SEVERE EPISODE OF RECURRENT MAJOR DEPRESSIVE DISORDER, WITHOUT PSYCHOTIC FEATURES (H): Primary | ICD-10-CM

## 2021-11-10 ASSESSMENT — PATIENT HEALTH QUESTIONNAIRE - PHQ9: SUM OF ALL RESPONSES TO PHQ QUESTIONS 1-9: 11

## 2021-11-10 NOTE — PROGRESS NOTES
[facility] TMS Program  [address], [address]  [place] [address]  TMS Procedure Note   Srinivas Young MRN# [id number]  Age: 40 year old year old YOB: 1980    Pre-Procedure:  History and Physical: Reviewed in medical record  Consent Signed by: Srinivas Young  On: 10/5/21    Clinical Narrative:  Patient is tolerating treatment.  Patient reports no changes.    Daily Adult Stimulation Safety Questionnaire: No or Yes in past 24 hours     1) Have you discontinued or started any new medication? No  2) Have you missed any doses of your medication differently then prescribed? No  3) Have you consumed alcohol or drugs (other than prescribed)?  No  4) Did you not sleep AT ALL last night?  No  5) Has your SI changed or worsened?  No      Indications for TMS:  MDD, recurrent, severe; 4+ medication trials (from 2+ classes) ineffective; Psychotherapy ineffective.     Pre-Procedure Diagnosis:  MDD, recurrent, severe F33.2    Treatment Hx:  Treatment number this series: 26  Total lifetime treatment number: 26  No Known Allergies   There were no vitals taken for this visit.       Pause for the Cause  Right patient:  Yes  Right procedure/correct coil:  Yes; rTMS; cpt 89973; H1 coil.   Earplugs in place:  Yes    Procedure  Patient was seated in procedure chair. Identity and procedure was verified. Ear plugs were placed in ears and patient-specific cap was placed on head and tightened appropriately. Ruler locations were verified. Coil was placed at treatment location and stimulator was set to parameters described below. A test train was delivered and pt tolerated train. Given pt tolerance, 55 treatment trains were delivered. Pt tolerated procedure with right facial movement.    Motor Threshold Determination  Distance from nasion to inion:   MT 1: 0 - 7 - 15.5 @ 61% on 10/5/2021    Stimulation Parameters  Frequency: 18 Hz     Train duration: 2 sec  Total pulses delivered: 1980  Inter-train interval: 20 sec  Tx  Loc: 0 - eye  - 15.5  Energy: 73% (120% MT)  Trains: 55 trains    Date MADRS IDS-SR PHQ-9   10/5/2021 24 29 17   10/15/21  23 7   10/22/21  18 8   10/29/21  16 9   11/5/21  17 9             Post-Procedure Diagnosis:  MDD, recurrent, severe F33.2    Laura Hernández, TMS Technician  [facility]    Plan   - Cont TMS    I did not see the patient during/after treatment but remained available in the clinic during  treatment.        Darnell Marrero MD  [facility]

## 2021-11-11 ENCOUNTER — OFFICE VISIT (OUTPATIENT)
Dept: PSYCHIATRY | Facility: CLINIC | Age: 41
End: 2021-11-11
Payer: MEDICARE

## 2021-11-11 ENCOUNTER — PRE VISIT (OUTPATIENT)
Dept: OTOLARYNGOLOGY | Facility: CLINIC | Age: 41
End: 2021-11-11

## 2021-11-11 DIAGNOSIS — F33.2 SEVERE EPISODE OF RECURRENT MAJOR DEPRESSIVE DISORDER, WITHOUT PSYCHOTIC FEATURES (H): Primary | ICD-10-CM

## 2021-11-11 ASSESSMENT — PATIENT HEALTH QUESTIONNAIRE - PHQ9: SUM OF ALL RESPONSES TO PHQ QUESTIONS 1-9: 8

## 2021-11-11 NOTE — PROGRESS NOTES
[facility] TMS Program  [address], [address]  [place] [address]  TMS Procedure Note   Srinivas Young MRN# [id number]  Age: 40 year old year old YOB: 1980    Pre-Procedure:  History and Physical: Reviewed in medical record  Consent Signed by: Srinivas Young  On: 10/5/21    Clinical Narrative:  Patient is tolerating treatment.  Patient reports no changes.    Daily Adult Stimulation Safety Questionnaire: No or Yes in past 24 hours     1) Have you discontinued or started any new medication? No  2) Have you missed any doses of your medication differently then prescribed? No  3) Have you consumed alcohol or drugs (other than prescribed)?  No  4) Did you not sleep AT ALL last night?  No  5) Has your SI changed or worsened?  No      Indications for TMS:  MDD, recurrent, severe; 4+ medication trials (from 2+ classes) ineffective; Psychotherapy ineffective.     Pre-Procedure Diagnosis:  MDD, recurrent, severe F33.2    Treatment Hx:  Treatment number this series: 27  Total lifetime treatment number: 27  No Known Allergies   There were no vitals taken for this visit.       Pause for the Cause  Right patient:  Yes  Right procedure/correct coil:  Yes; rTMS; cpt 16131; H1 coil.   Earplugs in place:  Yes    Procedure  Patient was seated in procedure chair. Identity and procedure was verified. Ear plugs were placed in ears and patient-specific cap was placed on head and tightened appropriately. Ruler locations were verified. Coil was placed at treatment location and stimulator was set to parameters described below. A test train was delivered and pt tolerated train. Given pt tolerance, 55 treatment trains were delivered. Pt tolerated procedure with right facial movement.    Motor Threshold Determination  Distance from nasion to inion:   MT 1: 0 - 7 - 15.5 @ 61% on 10/5/2021    Stimulation Parameters  Frequency: 18 Hz     Train duration: 2 sec  Total pulses delivered: 1980  Inter-train interval: 20 sec  Tx  Loc: 0 - eye  - 15.5  Energy: 73% (120% MT)  Trains: 55 trains    Date MADRS IDS-SR PHQ-9   10/5/2021 24 29 17   10/15/21  23 7   10/22/21  18 8   10/29/21  16 9   11/5/21  17 9             Post-Procedure Diagnosis:  MDD, recurrent, severe F33.2    Laura Hernández, TMS Technician  [facility]    Plan   - Cont TMS    I did not see the patient during/after treatment but remained available in the clinic during  treatment.    Elliot Steward MD  [facility]

## 2021-11-12 ENCOUNTER — OFFICE VISIT (OUTPATIENT)
Dept: PSYCHIATRY | Facility: CLINIC | Age: 41
End: 2021-11-12
Payer: MEDICARE

## 2021-11-12 DIAGNOSIS — F33.2 SEVERE EPISODE OF RECURRENT MAJOR DEPRESSIVE DISORDER, WITHOUT PSYCHOTIC FEATURES (H): Primary | ICD-10-CM

## 2021-11-12 ASSESSMENT — PATIENT HEALTH QUESTIONNAIRE - PHQ9: SUM OF ALL RESPONSES TO PHQ QUESTIONS 1-9: 9

## 2021-11-12 NOTE — PROGRESS NOTES
[facility] TMS Program  [address], [address]  [place] [address]  TMS Procedure Note   Srinivas Young MRN# [id number]  Age: 40 year old year old YOB: 1980    Pre-Procedure:  History and Physical: Reviewed in medical record  Consent Signed by: Srinivas Young  On: 10/5/21    Clinical Narrative:  Patient is tolerating treatment.  Patient reports no changes.    Daily Adult Stimulation Safety Questionnaire: No or Yes in past 24 hours     1) Have you discontinued or started any new medication? No  2) Have you missed any doses of your medication differently then prescribed? No  3) Have you consumed alcohol or drugs (other than prescribed)?  No  4) Did you not sleep AT ALL last night?  No  5) Has your SI changed or worsened?  No      Indications for TMS:  MDD, recurrent, severe; 4+ medication trials (from 2+ classes) ineffective; Psychotherapy ineffective.     Pre-Procedure Diagnosis:  MDD, recurrent, severe F33.2    Treatment Hx:  Treatment number this series: 28  Total lifetime treatment number: 28  No Known Allergies   There were no vitals taken for this visit.       Pause for the Cause  Right patient:  Yes  Right procedure/correct coil:  Yes; rTMS; cpt 65405; H1 coil.   Earplugs in place:  Yes    Procedure  Patient was seated in procedure chair. Identity and procedure was verified. Ear plugs were placed in ears and patient-specific cap was placed on head and tightened appropriately. Ruler locations were verified. Coil was placed at treatment location and stimulator was set to parameters described below. A test train was delivered and pt tolerated train. Given pt tolerance, 55 treatment trains were delivered. Pt tolerated procedure with right facial movement.    Motor Threshold Determination  Distance from nasion to inion:   MT 1: 0 - 7 - 15.5 @ 61% on 10/5/2021    Stimulation Parameters  Frequency: 18 Hz     Train duration: 2 sec  Total pulses delivered: 1980  Inter-train interval: 20 sec  Tx  Loc: 0 - eye  - 15.5  Energy: 73% (120% MT)  Trains: 55 trains    Date MADRS IDS-SR PHQ-9   10/5/2021 24 29 17   10/15/21  23 7   10/22/21  18 8   10/29/21  16 9   11/5/21  17 9   11/12/21  23 9       Post-Procedure Diagnosis:  MDD, recurrent, severe F33.2    Eden Loza, Psychometrist  [facility]  [facility]od[facility]    Plan   - Cont TMS    I did not see patient but remained available in the clinic throughout the TMS session.      Pilar Wynne MD  [facility]

## 2021-11-13 ASSESSMENT — PATIENT HEALTH QUESTIONNAIRE - PHQ9: SUM OF ALL RESPONSES TO PHQ QUESTIONS 1-9: 9

## 2021-11-15 ENCOUNTER — OFFICE VISIT (OUTPATIENT)
Dept: PSYCHIATRY | Facility: CLINIC | Age: 41
End: 2021-11-15
Payer: MEDICARE

## 2021-11-15 DIAGNOSIS — F33.2 SEVERE EPISODE OF RECURRENT MAJOR DEPRESSIVE DISORDER, WITHOUT PSYCHOTIC FEATURES (H): Primary | ICD-10-CM

## 2021-11-15 NOTE — PROGRESS NOTES
Srinivas Young MRN# [id number]  Age: 40 year old year old YOB: 1980     Pre-Procedure:  History and Physical: Reviewed in medical record  Consent Signed by: Srinivas Young  On: 10/5/21     Clinical Narrative:  Patient is tolerating treatment. Chose not to wear mouth guard today. Tolerated increase to 110%.     Indications for TMS:  MDD, recurrent, severe; 4+ medication trials (from 2+ classes) ineffective; Psychotherapy ineffective.      Pre-Procedure Diagnosis:  MDD, recurrent, severe 296.33     Treatment Hx:  Treatment number this series:    5  Total lifetime treatment number: 5     No Known Allergies   There were no vitals taken for this visit.     Pause for the Cause  Right patient:  Yes  Right procedure/correct coil:  Yes; rTMS; cpt 31413; H1 coil.   Earplugs in place:  Yes     Procedure  Patient was seated in procedure chair. Identity and procedure was verified. Ear plugs were placed in ears and patient-specific cap was placed on head and tightened appropriately. Ruler locations were placed on head per  specifications. Coil was placed at  0-7-15.5 and stimulator was set to initial 50%.  Mapping pulses were delivered and response was quantified by visual inspection..  MT was found with specific location and energy detailed below. Coil was then placed at treatment location and stimulator was set to parameters below Given pt tolerance, 55 treatment trains were delivered. Pt tolerated procedure.  Motor Threshold Determination  Distance from nasion to inion:   MT 1: 0 - 7 - 15.5 @ 61% on 10/5/2021     Stimulation Parameters  Frequency: 18 Hz                              Train duration: 2 sec  Total pulses delivered: 1980  Inter-train interval: 20 sec  Tx Loc: 0 - eye  - 15.5  Energy: 68% (110% MT)  Trains: 55 trains     Date MADRS IDS-SR PHQ-9   10/5/2021 24 29 17         Post-Procedure Diagnosis:  MDD, recurrent, severe 296.33     Thu Ramos RN  [facility]  Neuromodulation     Plan   - Cont TMS  - increase intensity as tolerable          MRAY JANE Hampton MD  [facility]

## 2021-11-15 NOTE — PROGRESS NOTES
[facility] TMS Program  [address], [address]  [place] [address]  TMS Procedure Note   Srinivas Young MRN# [id number]  Age: 40 year old year old YOB: 1980    Pre-Procedure:  History and Physical: Reviewed in medical record  Consent Signed by: Srinivas Young  On: 10/5/21    Clinical Narrative:  Patient is tolerating treatment.  Patient reports no changes.     Daily Adult Stimulation Safety Questionnaire: No or Yes in past 24 hours     1) Have you discontinued or started any new medication? No  2) Have you missed any doses of your medication differently then prescribed? No  3) Have you consumed alcohol or drugs (other than prescribed)?  No  4) Did you not sleep AT ALL last night?  No  5) Has your SI changed or worsened?  No      Indications for TMS:  MDD, recurrent, severe; 4+ medication trials (from 2+ classes) ineffective; Psychotherapy ineffective.     Pre-Procedure Diagnosis:  MDD, recurrent, severe F33.2    Treatment Hx:  Treatment number this series: 29  Total lifetime treatment number: 29  No Known Allergies   There were no vitals taken for this visit.       Pause for the Cause  Right patient:  Yes  Right procedure/correct coil:  Yes; rTMS; cpt 25642; H1 coil.   Earplugs in place:  Yes    Procedure  Patient was seated in procedure chair. Identity and procedure was verified. Ear plugs were placed in ears and patient-specific cap was placed on head and tightened appropriately. Ruler locations were verified. Coil was placed at treatment location and stimulator was set to parameters described below. A test train was delivered and pt tolerated train. Given pt tolerance, 55 treatment trains were delivered. Pt tolerated procedure with right facial movement.    Motor Threshold Determination  Distance from nasion to inion:   MT 1: 0 - 7 - 15.5 @ 61% on 10/5/2021    Stimulation Parameters  Frequency: 18 Hz     Train duration: 2 sec  Total pulses delivered: 1980  Inter-train interval: 20 sec  Tx  Loc: 0 - eye  - 15.5  Energy: 73% (120% MT)  Trains: 55 trains    Date MADRS IDS-SR PHQ-9   10/5/2021 24 29 17   10/15/21  23 7   10/22/21  18 8   10/29/21  16 9   11/5/21  17 9   11/12/21  23 9       Post-Procedure Diagnosis:  MDD, recurrent, severe F33.2    Kiana Carmen CMA  [facility]  [facility] Neu[facility]od[facility]    Plan   - Cont TMS    I did not see patient but remained available in the clinic throughout the TMS session.      Pilar Wynne MD  [facility]

## 2021-11-16 ENCOUNTER — OFFICE VISIT (OUTPATIENT)
Dept: PSYCHIATRY | Facility: CLINIC | Age: 41
End: 2021-11-16
Payer: MEDICARE

## 2021-11-16 DIAGNOSIS — F33.2 SEVERE EPISODE OF RECURRENT MAJOR DEPRESSIVE DISORDER, WITHOUT PSYCHOTIC FEATURES (H): Primary | ICD-10-CM

## 2021-11-16 ASSESSMENT — PATIENT HEALTH QUESTIONNAIRE - PHQ9: SUM OF ALL RESPONSES TO PHQ QUESTIONS 1-9: 10

## 2021-11-16 NOTE — PROGRESS NOTES
[facility] TMS Program  [address], [address]  [place] [address]  TMS Procedure Note   Srinivas Young MRN# [id number]  Age: 40 year old year old YOB: 1980    Pre-Procedure:  History and Physical: Reviewed in medical record  Consent Signed by: Srinivas Young  On: 10/5/21    Clinical Narrative:  Patient is tolerating treatment.  Patient reports no changes. Patient wanted to know if it was ok for his psychiatrist to change his med Focalin from 10 mg regular release to 15 mg extended release.  Thu Mims RN said it would be ok we just need to know when he changes.     Daily Adult Stimulation Safety Questionnaire: No or Yes in past 24 hours     1) Have you discontinued or started any new medication? No  2) Have you missed any doses of your medication differently then prescribed? No  3) Have you consumed alcohol or drugs (other than prescribed)?  No  4) Did you not sleep AT ALL last night?  No  5) Has your SI changed or worsened?  No      Indications for TMS:  MDD, recurrent, severe; 4+ medication trials (from 2+ classes) ineffective; Psychotherapy ineffective.     Pre-Procedure Diagnosis:  MDD, recurrent, severe F33.2    Treatment Hx:  Treatment number this series: 30  Total lifetime treatment number: 30  No Known Allergies   There were no vitals taken for this visit.       Pause for the Cause  Right patient:  Yes  Right procedure/correct coil:  Yes; rTMS; cpt 44237; H1 coil.   Earplugs in place:  Yes    Procedure  Patient was seated in procedure chair. Identity and procedure was verified. Ear plugs were placed in ears and patient-specific cap was placed on head and tightened appropriately. Ruler locations were verified. Coil was placed at treatment location and stimulator was set to parameters described below. A test train was delivered and pt tolerated train. Given pt tolerance, 55 treatment trains were delivered. Pt tolerated procedure with right facial movement.    Motor Threshold  Determination  Distance from nasion to inion:   MT 1: 0 - 7 - 15.5 @ 61% on 10/5/2021    Stimulation Parameters  Frequency: 18 Hz     Train duration: 2 sec  Total pulses delivered: 1980  Inter-train interval: 20 sec  Tx Loc: 0 - eye  - 15.5  Energy: 73% (120% MT)  Trains: 55 trains    Date MADRS IDS-SR PHQ-9   10/5/2021 24 29 17   10/15/21  23 7   10/22/21  18 8   10/29/21  16 9   11/5/21  17 9   11/12/21  23 9       Post-Procedure Diagnosis:  MDD, recurrent, severe F33.2    Kiana Carmen CMA  [facility] Neuromod[facility]    Plan   - Cont TMS      I did not see the patient during/after treatment but remained available in the clinic during  treatment.    Liz Hampton MD  [facility] Neu[facility]od[facility]

## 2021-11-17 ENCOUNTER — OFFICE VISIT (OUTPATIENT)
Dept: PSYCHIATRY | Facility: CLINIC | Age: 41
End: 2021-11-17
Payer: MEDICARE

## 2021-11-17 ENCOUNTER — ALLIED HEALTH/NURSE VISIT (OUTPATIENT)
Dept: PSYCHIATRY | Facility: CLINIC | Age: 41
End: 2021-11-17
Payer: MEDICARE

## 2021-11-17 DIAGNOSIS — F33.2 SEVERE EPISODE OF RECURRENT MAJOR DEPRESSIVE DISORDER, WITHOUT PSYCHOTIC FEATURES (H): Primary | ICD-10-CM

## 2021-11-17 ASSESSMENT — PATIENT HEALTH QUESTIONNAIRE - PHQ9: SUM OF ALL RESPONSES TO PHQ QUESTIONS 1-9: 14

## 2021-11-17 NOTE — PROGRESS NOTES
[facility] TMS Program  [address], [address]  [place] [address]  TMS Procedure Note   Srinivas Young MRN# [id number]  Age: 40 year old year old YOB: 1980    Pre-Procedure:  History and Physical: Reviewed in medical record  Consent Signed by: Srinivas Young  On: 10/5/21    Clinical Narrative:  Patient is tolerating treatment.  Patient reports no changes, used phone during treatment.    Daily Adult Stimulation Safety Questionnaire: No or Yes in past 24 hours     1) Have you discontinued or started any new medication? No  2) Have you missed any doses of your medication differently then prescribed? No  3) Have you consumed alcohol or drugs (other than prescribed)?  No  4) Did you not sleep AT ALL last night?  No  5) Has your SI changed or worsened?  No      Indications for TMS:  MDD, recurrent, severe; 4+ medication trials (from 2+ classes) ineffective; Psychotherapy ineffective.     Pre-Procedure Diagnosis:  MDD, recurrent, severe F33.2    Treatment Hx:  Treatment number this series: 31  Total lifetime treatment number: 31  No Known Allergies   There were no vitals taken for this visit.       Pause for the Cause  Right patient:  Yes  Right procedure/correct coil:  Yes; rTMS; cpt 15811; H1 coil.   Earplugs in place:  Yes    Procedure  Patient was seated in procedure chair. Identity and procedure was verified. Ear plugs were placed in ears and patient-specific cap was placed on head and tightened appropriately. Ruler locations were verified. Coil was placed at treatment location and stimulator was set to parameters described below. A test train was delivered and pt tolerated train. Given pt tolerance, 55 treatment trains were delivered. Pt tolerated procedure with right facial movement.    Motor Threshold Determination  Distance from nasion to inion:   MT 1: 0 - 7 - 15.5 @ 61% on 10/5/2021    Stimulation Parameters  Frequency: 18 Hz     Train duration: 2 sec  Total pulses delivered:  1980  Inter-train interval: 20 sec  Tx Loc: 0 - eye  - 15.5  Energy: 73% (120% MT)  Trains: 55 trains    Date MADRS IDS-SR PHQ-9   10/5/2021 24 29 17   10/15/21  23 7   10/22/21  18 8   10/29/21  16 9   11/5/21  17 9   11/12/21  23 9       Post-Procedure Diagnosis:  MDD, recurrent, severe F33.2    Laura Hernández, TMS Technician  [facility]    Plan   - Cont TMS    I did not see the patient but remained available in clinic throughout treatment.      London Shelton MD  [facility]

## 2021-11-17 NOTE — PROGRESS NOTES
[facility] :  Care Coordination Note     SITUATION   Srinivas Young is a 41 year old male who is receiving support for:  Clinic Care Coordination - Follow-up (TMS Check In )      BACKGROUND     Patient has completed 31 sessions of a 37 session TMS course    ASSESSMENT     Patient reports that TMS had been going very well.  States that prior to TMS, he felt numb and could not experience emotion.  States that in the first three weeks he cried and was so thankful as TMS opened up his ability to feel emotion.      Discussed how his current PHQ9 scores have been climbing back up.  He stated that he is currently going through a breakup with his girlfriend; and they also share a 2 year old daughter together as well.  States he also started a new job, but won't get paid until January, so currently he is volunteering his time.  This has caused some stress related to financing as well.     He also shared that his therapist closed their practice, and he cannot see a new one until the end of January.  Offered to have patient start BAT with CAROLINE Warren which he can do via video.  He elected to do this.      Reviewed end of TMS planning with patient.  He verbalized understanding and did not have any questions.                PLAN          Follow-up plan:  Finish TMS       Bronwyn Mukherjee RN

## 2021-11-18 ENCOUNTER — OFFICE VISIT (OUTPATIENT)
Dept: PSYCHIATRY | Facility: CLINIC | Age: 41
End: 2021-11-18
Payer: MEDICARE

## 2021-11-18 DIAGNOSIS — F33.2 SEVERE EPISODE OF RECURRENT MAJOR DEPRESSIVE DISORDER, WITHOUT PSYCHOTIC FEATURES (H): Primary | ICD-10-CM

## 2021-11-18 ASSESSMENT — PATIENT HEALTH QUESTIONNAIRE - PHQ9: SUM OF ALL RESPONSES TO PHQ QUESTIONS 1-9: 15

## 2021-11-18 NOTE — PROGRESS NOTES
[facility] TMS Program  [address], [address]  [place] [address]  TMS Procedure Note   Srinivas Young MRN# [id number]  Age: 40 year old year old YOB: 1980    Pre-Procedure:  History and Physical: Reviewed in medical record  Consent Signed by: Srinivas Young  On: 10/5/21    Clinical Narrative:  Patient is tolerating treatment.  Patient reports he has lost 10 pounds and feels like TMS has contributed to that weight loss. Patient  used phone during treatment.    Daily Adult Stimulation Safety Questionnaire: No or Yes in past 24 hours     1) Have you discontinued or started any new medication? No  2) Have you missed any doses of your medication differently then prescribed? No  3) Have you consumed alcohol or drugs (other than prescribed)?  No  4) Did you not sleep AT ALL last night?  No  5) Has your SI changed or worsened?  No      Indications for TMS:  MDD, recurrent, severe; 4+ medication trials (from 2+ classes) ineffective; Psychotherapy ineffective.     Pre-Procedure Diagnosis:  MDD, recurrent, severe F33.2    Treatment Hx:  Treatment number this series: 32  Total lifetime treatment number: 32  No Known Allergies   There were no vitals taken for this visit.       Pause for the Cause  Right patient:  Yes  Right procedure/correct coil:  Yes; rTMS; cpt 64046; H1 coil.   Earplugs in place:  Yes    Procedure  Patient was seated in procedure chair. Identity and procedure was verified. Ear plugs were placed in ears and patient-specific cap was placed on head and tightened appropriately. Ruler locations were verified. Coil was placed at treatment location and stimulator was set to parameters described below. A test train was delivered and pt tolerated train. Given pt tolerance, 55 treatment trains were delivered. Pt tolerated procedure with right facial movement.    Motor Threshold Determination  Distance from nasion to inion:   MT 1: 0 - 7 - 15.5 @ 61% on 10/5/2021    Stimulation  Parameters  Frequency: 18 Hz     Train duration: 2 sec  Total pulses delivered: 1980  Inter-train interval: 20 sec  Tx Loc: 0 - eye  - 15.5  Energy: 73% (120% MT)  Trains: 55 trains    Date MADRS IDS-SR PHQ-9   10/5/2021 24 29 17   10/15/21  23 7   10/22/21  18 8   10/29/21  16 9   11/5/21  17 9   11/12/21  23 9       Post-Procedure Diagnosis:  MDD, recurrent, severe F33.2    Laura Hernández, TMS Technician  [facility]    Plan   - Cont TMS    I did not see the patient during/after treatment but remained available in the clinic during  treatment.    Liz Hampton MD  [facility]

## 2021-11-19 ENCOUNTER — OFFICE VISIT (OUTPATIENT)
Dept: PSYCHIATRY | Facility: CLINIC | Age: 41
End: 2021-11-19
Payer: MEDICARE

## 2021-11-19 DIAGNOSIS — F33.2 SEVERE EPISODE OF RECURRENT MAJOR DEPRESSIVE DISORDER, WITHOUT PSYCHOTIC FEATURES (H): Primary | ICD-10-CM

## 2021-11-19 ASSESSMENT — PATIENT HEALTH QUESTIONNAIRE - PHQ9: SUM OF ALL RESPONSES TO PHQ QUESTIONS 1-9: 9

## 2021-11-19 NOTE — PROGRESS NOTES
[facility] TMS Program  [address], [address]  [place] [address]  TMS Procedure Note   Srinivas Young MRN# [id number]  Age: 40 year old year old YOB: 1980    Pre-Procedure:  History and Physical: Reviewed in medical record  Consent Signed by: Srinivas Young  On: 10/5/21    Clinical Narrative:  Patient is tolerating treatment.Patient  used phone during treatment.    Daily Adult Stimulation Safety Questionnaire: No or Yes in past 24 hours     1) Have you discontinued or started any new medication? No  2) Have you missed any doses of your medication differently then prescribed? No  3) Have you consumed alcohol or drugs (other than prescribed)?  No  4) Did you not sleep AT ALL last night?  No  5) Has your SI changed or worsened?  No      Indications for TMS:  MDD, recurrent, severe; 4+ medication trials (from 2+ classes) ineffective; Psychotherapy ineffective.     Pre-Procedure Diagnosis:  MDD, recurrent, severe F33.2    Treatment Hx:  Treatment number this series: 33  Total lifetime treatment number: 33  No Known Allergies   There were no vitals taken for this visit.       Pause for the Cause  Right patient:  Yes  Right procedure/correct coil:  Yes; rTMS; cpt 71344; H1 coil.   Earplugs in place:  Yes    Procedure  Patient was seated in procedure chair. Identity and procedure was verified. Ear plugs were placed in ears and patient-specific cap was placed on head and tightened appropriately. Ruler locations were verified. Coil was placed at treatment location and stimulator was set to parameters described below. A test train was delivered and pt tolerated train. Given pt tolerance, 55 treatment trains were delivered. Pt tolerated procedure with right facial movement.    Motor Threshold Determination  Distance from nasion to inion:   MT 1: 0 - 7 - 15.5 @ 61% on 10/5/2021    Stimulation Parameters  Frequency: 18 Hz     Train duration: 2 sec  Total pulses delivered: 1980  Inter-train interval: 20  sec  Tx Loc: 0 - eye  - 15.5  Energy: 73% (120% MT)  Trains: 55 trains    Date MADRS IDS-SR PHQ-9   10/5/2021 24 29 17   10/15/21  23 7   10/22/21  18 8   10/29/21  16 9   11/5/21  17 9   11/12/21  23 9   11/19/21  28 10             Post-Procedure Diagnosis:  MDD, recurrent, severe F33.2    Laura Hernández, TMS Technician  [facility]    Plan   - Cont TMS    I did not see the patient during/after treatment but remained available in the clinic during  treatment.      Darnell Marrero MD  [facility]

## 2021-11-20 ASSESSMENT — PATIENT HEALTH QUESTIONNAIRE - PHQ9: SUM OF ALL RESPONSES TO PHQ QUESTIONS 1-9: 10

## 2021-11-22 ENCOUNTER — OFFICE VISIT (OUTPATIENT)
Dept: PSYCHIATRY | Facility: CLINIC | Age: 41
End: 2021-11-22
Payer: MEDICARE

## 2021-11-22 DIAGNOSIS — F33.2 SEVERE EPISODE OF RECURRENT MAJOR DEPRESSIVE DISORDER, WITHOUT PSYCHOTIC FEATURES (H): Primary | ICD-10-CM

## 2021-11-22 NOTE — PROGRESS NOTES
[facility] TMS Program  [address], [address]  [place] [address]  TMS Procedure Note   Srinivas Young MRN# [id number]  Age: 40 year old year old YOB: 1980    Pre-Procedure:  History and Physical: Reviewed in medical record  Consent Signed by: Srinivas Young  On: 10/5/21    Clinical Narrative:  Patient is tolerating treatment.Patient  used phone during treatment.    Daily Adult Stimulation Safety Questionnaire: No or Yes in past 24 hours     1) Have you discontinued or started any new medication? No  2) Have you missed any doses of your medication differently then prescribed? No  3) Have you consumed alcohol or drugs (other than prescribed)?  No  4) Did you not sleep AT ALL last night?  No  5) Has your SI changed or worsened?  No      Indications for TMS:  MDD, recurrent, severe; 4+ medication trials (from 2+ classes) ineffective; Psychotherapy ineffective.     Pre-Procedure Diagnosis:  MDD, recurrent, severe F33.2    Treatment Hx:  Treatment number this series: 34  Total lifetime treatment number: 34  No Known Allergies   There were no vitals taken for this visit.       Pause for the Cause  Right patient:  Yes  Right procedure/correct coil:  Yes; rTMS; cpt 72978; H1 coil.   Earplugs in place:  Yes    Procedure  Patient was seated in procedure chair. Identity and procedure was verified. Ear plugs were placed in ears and patient-specific cap was placed on head and tightened appropriately. Ruler locations were verified. Coil was placed at treatment location and stimulator was set to parameters described below. A test train was delivered and pt tolerated train. Given pt tolerance, 55 treatment trains were delivered. Pt tolerated procedure with right facial movement.    Motor Threshold Determination  Distance from nasion to inion:   MT 1: 0 - 7 - 15.5 @ 61% on 10/5/2021    Stimulation Parameters  Frequency: 18 Hz     Train duration: 2 sec  Total pulses delivered: 1980  Inter-train interval: 20  sec  Tx Loc: 0 - eye  - 15.5  Energy: 73% (120% MT)  Trains: 55 trains    Date MADRS IDS-SR PHQ-9   10/5/2021 24 29 17   10/15/21  23 7   10/22/21  18 8   10/29/21  16 9   11/5/21  17 9   11/12/21  23 9   11/19/21  28 10             Post-Procedure Diagnosis:  MDD, recurrent, severe F33.2    Laura Hernández, TMS Technician  [facility]    Plan   - Cont TMS    I did not see patient but remained available in the clinic throughout the TMS session.      Pilar Wynne MD  [facility]
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
## 2021-11-23 ENCOUNTER — OFFICE VISIT (OUTPATIENT)
Dept: PSYCHIATRY | Facility: CLINIC | Age: 41
End: 2021-11-23
Payer: MEDICARE

## 2021-11-23 DIAGNOSIS — F33.2 SEVERE EPISODE OF RECURRENT MAJOR DEPRESSIVE DISORDER, WITHOUT PSYCHOTIC FEATURES (H): Primary | ICD-10-CM

## 2021-11-23 ASSESSMENT — PATIENT HEALTH QUESTIONNAIRE - PHQ9: SUM OF ALL RESPONSES TO PHQ QUESTIONS 1-9: 3

## 2021-11-23 NOTE — PROGRESS NOTES
[facility]MS [facility]  [address], [address]  [place] [address]  TMS Procedure Note   Srinivas Young MRN# [id number]  Age: 40 year old year old YOB: 1980    Pre-Procedure:  History and Physical: Reviewed in medical record  Consent Signed by: Srinivas Young  On: 10/5/21    Clinical Narrative:  Patient is tolerating treatment. Patient  used phone during treatment. Patient reports how TMS has helped uplift his mood.    Daily Adult Stimulation Safety Questionnaire: No or Yes in past 24 hours     1) Have you discontinued or started any new medication? No  2) Have you missed any doses of your medication differently then prescribed? No  3) Have you consumed alcohol or drugs (other than prescribed)?  No  4) Did you not sleep AT ALL last night?  No  5) Has your SI changed or worsened?  No      Indications for TMS:  MDD, recurrent, severe; 4+ medication trials (from 2+ classes) ineffective; Psychotherapy ineffective.     Pre-Procedure Diagnosis:  MDD, recurrent, severe F33.2    Treatment Hx:  Treatment number this series: 35  Total lifetime treatment number: 3  No Known Allergies   There were no vitals taken for this visit.       Pause for the Cause  Right patient:  Yes  Right procedure/correct coil:  Yes; rTMS; cpt 14124; H1 coil.   Earplugs in place:  Yes    Procedure  Patient was seated in procedure chair. Identity and procedure was verified. Ear plugs were placed in ears and patient-specific cap was placed on head and tightened appropriately. Ruler locations were verified. Coil was placed at treatment location and stimulator was set to parameters described below. A test train was delivered and pt tolerated train. Given pt tolerance, 55 treatment trains were delivered. Pt tolerated procedure with right facial movement.    Motor Threshold Determination  Distance from nasion to inion:   MT 1: 0 - 7 - 15.5 @ 61% on 10/5/2021    Stimulation Parameters  Frequency: 18 Hz     Train duration: 2 sec  Total  pulses delivered: 1980  Inter-train interval: 20 sec  Tx Loc: 0 - eye  - 15.5  Energy: 73% (120% MT)  Trains: 55 trains    Date MADRS IDS-SR PHQ-9   10/5/2021 24 29 17   10/15/21  23 7   10/22/21  18 8   10/29/21  16 9   11/5/21  17 9   11/12/21  23 9   11/19/21  28 10             Post-Procedure Diagnosis:  MDD, recurrent, severe F33.2    Laura Hernández, TMS Technician  [facility]    Plan   - Cont TMS      I did not see the patient during/after treatment but remained available in the clinic during  treatment.    Liz Hampton MD  [facility]

## 2021-11-24 ENCOUNTER — OFFICE VISIT (OUTPATIENT)
Dept: PSYCHIATRY | Facility: CLINIC | Age: 41
End: 2021-11-24
Payer: MEDICARE

## 2021-11-24 DIAGNOSIS — F33.2 SEVERE EPISODE OF RECURRENT MAJOR DEPRESSIVE DISORDER, WITHOUT PSYCHOTIC FEATURES (H): Primary | ICD-10-CM

## 2021-11-24 ASSESSMENT — PATIENT HEALTH QUESTIONNAIRE - PHQ9: SUM OF ALL RESPONSES TO PHQ QUESTIONS 1-9: 2

## 2021-11-24 NOTE — PROGRESS NOTES
[facility] TMS [facility]  [address], [address]  [place] [address]  TMS Procedure Note   Srinivas Young MRN# [id number]  Age: 40 year old year old YOB: 1980    Pre-Procedure:  History and Physical: Reviewed in medical record  Consent Signed by: Srinivas Young  On: 10/5/21    Clinical Narrative:  Patient is tolerating treatment. Patient  used phone during treatment. No changes reported.    Daily Adult Stimulation Safety Questionnaire: No or Yes in past 24 hours     1) Have you discontinued or started any new medication? No  2) Have you missed any doses of your medication differently then prescribed? No  3) Have you consumed alcohol or drugs (other than prescribed)?  No  4) Did you not sleep AT ALL last night?  No  5) Has your SI changed or worsened?  No      Indications for TMS:  MDD, recurrent, severe; 4+ medication trials (from 2+ classes) ineffective; Psychotherapy ineffective.     Pre-Procedure Diagnosis:  MDD, recurrent, severe F33.2    Treatment Hx:  Treatment number this series: 36  Total lifetime treatment number: 36  No Known Allergies   There were no vitals taken for this visit.       Pause for the Cause  Right patient:  Yes  Right procedure/correct coil:  Yes; rTMS; cpt 28304; H1 coil.   Earplugs in place:  Yes    Procedure  Patient was seated in procedure chair. Identity and procedure was verified. Ear plugs were placed in ears and patient-specific cap was placed on head and tightened appropriately. Ruler locations were verified. Coil was placed at treatment location and stimulator was set to parameters described below. A test train was delivered and pt tolerated train. Given pt tolerance, 55 treatment trains were delivered. Pt tolerated procedure with right facial movement.    Motor Threshold Determination  Distance from nasion to inion:   MT 1: 0 - 7 - 15.5 @ 61% on 10/5/2021    Stimulation Parameters  Frequency: 18 Hz     Train duration: 2 sec  Total pulses delivered:  1980  Inter-train interval: 20 sec  Tx Loc: 0 - eye  - 15.5  Energy: 73% (120% MT)  Trains: 55 trains    Date MADRS IDS-SR PHQ-9   10/5/2021 24 29 17   10/15/21  23 7   10/22/21  18 8   10/29/21  16 9   11/5/21  17 9   11/12/21  23 9   11/19/21  28 10             Post-Procedure Diagnosis:  MDD, recurrent, severe F33.2    Laura Hernández, TMS Technician  [facility]    Plan   - Cont TMS      I did not see patient but remained available in the clinic throughout the TMS session.      Pilar Wynne MD  [facility]

## 2021-11-25 ASSESSMENT — PATIENT HEALTH QUESTIONNAIRE - PHQ9: SUM OF ALL RESPONSES TO PHQ QUESTIONS 1-9: 5

## 2021-11-26 ENCOUNTER — OFFICE VISIT (OUTPATIENT)
Dept: PSYCHIATRY | Facility: CLINIC | Age: 41
End: 2021-11-26
Payer: MEDICARE

## 2021-11-26 ENCOUNTER — TELEPHONE (OUTPATIENT)
Dept: OTOLARYNGOLOGY | Facility: CLINIC | Age: 41
End: 2021-11-26
Payer: MEDICARE

## 2021-11-26 DIAGNOSIS — F33.2 SEVERE EPISODE OF RECURRENT MAJOR DEPRESSIVE DISORDER, WITHOUT PSYCHOTIC FEATURES (H): Primary | ICD-10-CM

## 2021-11-26 NOTE — TELEPHONE ENCOUNTER
LVM after being unable to reach patient. Left ENT scheduling number if patient had any concerns. Stated Dr. Dominguez is leaving clinic after 12/02, so patient will need to reschedule 4 month follow up to Dr. Kurt Villalta who is taking over ll of Dr. Dominguez's patients. Provided contact number for Dr. Villalta at [facility] ([phone]) or  [facility] ([phone]).

## 2021-11-26 NOTE — PROGRESS NOTES
[facility] TMS [facility]  [address], [address]  [place] [address]  TMS Procedure Note   Srinivas Young MRN# [id number]  Age: 40 year old year old YOB: 1980    Pre-Procedure:  History and Physical: Reviewed in medical record  Consent Signed by: Srinivas Young  On: 10/5/21    Clinical Narrative:  Patient is tolerating treatment. Patient  used phone during treatment. No changes reported.    Daily Adult Stimulation Safety Questionnaire: No or Yes in past 24 hours     1) Have you discontinued or started any new medication? No  2) Have you missed any doses of your medication differently then prescribed? No  3) Have you consumed alcohol or drugs (other than prescribed)?  No  4) Did you not sleep AT ALL last night?  No  5) Has your SI changed or worsened?  No      Indications for TMS:  MDD, recurrent, severe; 4+ medication trials (from 2+ classes) ineffective; Psychotherapy ineffective.     Pre-Procedure Diagnosis:  MDD, recurrent, severe F33.2    Treatment Hx:  Treatment number this series: 37  Total lifetime treatment number: 37  No Known Allergies   There were no vitals taken for this visit.       Pause for the Cause  Right patient:  Yes  Right procedure/correct coil:  Yes; rTMS; cpt 63448; H1 coil.   Earplugs in place:  Yes    Procedure  Patient was seated in procedure chair. Identity and procedure was verified. Ear plugs were placed in ears and patient-specific cap was placed on head and tightened appropriately. Ruler locations were verified. Coil was placed at treatment location and stimulator was set to parameters described below. A test train was delivered and pt tolerated train. Given pt tolerance, 55 treatment trains were delivered. Pt tolerated procedure with right facial movement.    Motor Threshold Determination  Distance from nasion to inion:   MT 1: 0 - 7 - 15.5 @ 61% on 10/5/2021    Stimulation Parameters  Frequency: 18 Hz     Train duration: 2 sec  Total pulses delivered:  1980  Inter-train interval: 20 sec  Tx Loc: 0 - eye  - 15.5  Energy: 73% (120% MT)  Trains: 55 trains    Date MADRS IDS-SR PHQ-9   10/5/2021 24 29 17   10/15/21  23 7   10/22/21  18 8   10/29/21  16 9   11/5/21  17 9   11/12/21  23 9   11/19/21  28 10   11/26/21 1 9 29       Post-Procedure Diagnosis:  MDD, recurrent, severe F33.2    Laura Hernández, TMS Technician  [facility]    Plan   - Cont TMS      I did not see the patient during/after treatment but remained available in the clinic during  treatment.        Pilar Wynne MD  [facility]

## 2021-11-27 ASSESSMENT — PATIENT HEALTH QUESTIONNAIRE - PHQ9: SUM OF ALL RESPONSES TO PHQ QUESTIONS 1-9: 9

## 2021-12-01 ENCOUNTER — VIRTUAL VISIT (OUTPATIENT)
Dept: PSYCHIATRY | Facility: CLINIC | Age: 41
End: 2021-12-01
Payer: MEDICARE

## 2021-12-01 DIAGNOSIS — F33.2 SEVERE EPISODE OF RECURRENT MAJOR DEPRESSIVE DISORDER, WITHOUT PSYCHOTIC FEATURES (H): Primary | ICD-10-CM

## 2021-12-01 NOTE — PROGRESS NOTES
Writer called patient to check in after finsihing TMS.  Received voicemail.  Left message asking for a return call.  Clinic number provided.

## 2021-12-09 ENCOUNTER — VIRTUAL VISIT (OUTPATIENT)
Dept: PSYCHIATRY | Facility: CLINIC | Age: 41
End: 2021-12-09
Payer: MEDICARE

## 2021-12-09 DIAGNOSIS — F33.2 SEVERE EPISODE OF RECURRENT MAJOR DEPRESSIVE DISORDER, WITHOUT PSYCHOTIC FEATURES (H): Primary | ICD-10-CM

## 2021-12-09 NOTE — PROGRESS NOTES
[facility] Resistant Depression [facility]  Care Coordiantion  A part of the [facility] Psychiat[facility] Mood Disorders Program    Srinivas Young MRN# [id number]   Age: 41 year old YOB: 1980     Date of appointment: 12/09/21  Start Time: 1:07; End Time: 1:55    Mode of communication: [employer] (HIPAA compliant, secure platform). Patient consented verbally to this mode of therapy today.  Reason for telehealth: COVID-19. This patient visit was converted to a telehealth visit to minimize exposure to COVID-19.    Originating Location (patient location): home, located in [place], [place]  Distant Location (provider location): Home office, located in [place], [place], using appropriate privacy considerations and procedures         Care Team     PCP- Jessy Rice  Specialty Providers- no  Therapist- Susan hartley [facility] for individual  Outpatient Medication  Provider- yes, PCP  TRJOEL Psychiatrist: Sunni    CHIEF COMPLAINT, NEEDS, GOALS     Srinivas Young is a 41 year old male who uses Srinivas  and she, her, hers pronouns.     Today, Srinivas reports passive SI, denies a plan, and denies intent. Srinivas also identifies protective factors of describes a safety plan, h/o seeking help when needed, commitment to family and stable housing.     Current challenges or needs to be address today symptoms of depression, including low mood, lack of motivation and activation, low self-worth, self-doubt. Srinivas also reports stress about discord his relationship with his partner, concerns about finding work, feelings of isolation.      Discussed BA vs longer term therapy such as DBT. rSinivas reports he has benefited from learning DBT skills in individual therapy and intends to start a DBRT program.       Resources/referrals                                                                                             m2, h3   The following community resources were provided through discussion and follow up:    Referrals: [facility]  [facility], [facility]    RTC: one week    LASHA WarrenSW

## 2021-12-15 ENCOUNTER — VIRTUAL VISIT (OUTPATIENT)
Dept: PSYCHIATRY | Facility: CLINIC | Age: 41
End: 2021-12-15
Payer: MEDICARE

## 2021-12-15 DIAGNOSIS — F33.2 SEVERE EPISODE OF RECURRENT MAJOR DEPRESSIVE DISORDER, WITHOUT PSYCHOTIC FEATURES (H): Primary | ICD-10-CM

## 2021-12-22 ENCOUNTER — VIRTUAL VISIT (OUTPATIENT)
Dept: PSYCHIATRY | Facility: CLINIC | Age: 41
End: 2021-12-22
Payer: MEDICARE

## 2021-12-22 DIAGNOSIS — F41.1 GENERALIZED ANXIETY DISORDER: ICD-10-CM

## 2021-12-22 DIAGNOSIS — F33.2 SEVERE EPISODE OF RECURRENT MAJOR DEPRESSIVE DISORDER, WITHOUT PSYCHOTIC FEATURES (H): Primary | ICD-10-CM

## 2021-12-22 DIAGNOSIS — Z78.9 ALCOHOL USE: ICD-10-CM

## 2021-12-22 ASSESSMENT — PATIENT HEALTH QUESTIONNAIRE - PHQ9: SUM OF ALL RESPONSES TO PHQ QUESTIONS 1-9: 18
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
## 2021-12-22 NOTE — PROGRESS NOTES
Srinivas is a 41 year old who is being evaluated via a billable video visit.      How would you like to obtain your AVS? [employer]  If the video visit is dropped, the invitation should be resent by: Text to cell phone: [phone]  Will anyone else be joining your video visit? No      Video Start Time: 1457  Video-Visit Details    Type of service:  Video Visit    Video End Time:1535    Originating Location (pt. Location): Home    Distant Location (provider location):  [facility]     Platform used for Video Visit: Ana Cristina

## 2021-12-25 ENCOUNTER — TELEPHONE (OUTPATIENT)
Dept: BEHAVIORAL HEALTH | Facility: CLINIC | Age: 41
End: 2021-12-25

## 2021-12-25 ENCOUNTER — HOSPITAL ENCOUNTER (EMERGENCY)
Facility: CLINIC | Age: 41
Discharge: HOME | End: 2021-12-26
Attending: EMERGENCY MEDICINE | Admitting: EMERGENCY MEDICINE
Payer: MEDICARE

## 2021-12-25 ENCOUNTER — HOSPITAL ENCOUNTER (INPATIENT)
Age: 41
End: 2021-12-25
Payer: MEDICARE

## 2021-12-25 DIAGNOSIS — R45.851 SUICIDAL IDEATION: ICD-10-CM

## 2021-12-25 LAB
AMPHETAMINES UR QL SCN: ABNORMAL
BARBITURATES UR QL: ABNORMAL
BENZODIAZ UR QL: ABNORMAL
CANNABINOIDS UR QL SCN: ABNORMAL
COCAINE UR QL: ABNORMAL
ETHANOL UR QL SCN: ABNORMAL
OPIATES UR QL SCN: ABNORMAL
SARS-COV-2 RNA RESP QL NAA+PROBE: NEGATIVE

## 2021-12-25 PROCEDURE — 93005 ELECTROCARDIOGRAM TRACING: CPT | Performed by: EMERGENCY MEDICINE

## 2021-12-25 PROCEDURE — 93010 ELECTROCARDIOGRAM REPORT: CPT | Performed by: EMERGENCY MEDICINE

## 2021-12-25 PROCEDURE — 99284 EMERGENCY DEPT VISIT MOD MDM: CPT | Mod: 25 | Performed by: EMERGENCY MEDICINE

## 2021-12-25 PROCEDURE — 90791 PSYCH DIAGNOSTIC EVALUATION: CPT

## 2021-12-25 PROCEDURE — 99285 EMERGENCY DEPT VISIT HI MDM: CPT | Mod: 25 | Performed by: EMERGENCY MEDICINE

## 2021-12-25 PROCEDURE — 87635 SARS-COV-2 COVID-19 AMP PRB: CPT | Performed by: EMERGENCY MEDICINE

## 2021-12-25 PROCEDURE — 80307 DRUG TEST PRSMV CHEM ANLYZR: CPT | Performed by: EMERGENCY MEDICINE

## 2021-12-25 PROCEDURE — 250N000013 HC RX MED GY IP 250 OP 250 PS 637: Performed by: EMERGENCY MEDICINE

## 2021-12-25 PROCEDURE — 93005 ELECTROCARDIOGRAM TRACING: CPT | Mod: 76 | Performed by: EMERGENCY MEDICINE

## 2021-12-25 PROCEDURE — C9803 HOPD COVID-19 SPEC COLLECT: HCPCS | Performed by: EMERGENCY MEDICINE

## 2021-12-25 RX ORDER — SUMATRIPTAN 100 MG/1
100 TABLET, FILM COATED ORAL ONCE
Status: COMPLETED | OUTPATIENT
Start: 2021-12-25 | End: 2021-12-25

## 2021-12-25 RX ORDER — MIRABEGRON 25 MG/1
25 TABLET, FILM COATED, EXTENDED RELEASE ORAL DAILY
COMMUNITY
End: 2022-06-07

## 2021-12-25 RX ORDER — DEXMETHYLPHENIDATE HYDROCHLORIDE 15 MG/1
15 CAPSULE, EXTENDED RELEASE ORAL DAILY
COMMUNITY

## 2021-12-25 RX ADMIN — SUMATRIPTAN SUCCINATE 100 MG: 100 TABLET, FILM COATED ORAL at 19:44

## 2021-12-25 ASSESSMENT — ENCOUNTER SYMPTOMS
CONFUSION: 0
ABDOMINAL PAIN: 0
FEVER: 0
NECK STIFFNESS: 0
COLOR CHANGE: 0
ARTHRALGIAS: 0
DIFFICULTY URINATING: 0
SHORTNESS OF BREATH: 0
EYE REDNESS: 0
HEADACHES: 0
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
## 2021-12-25 NOTE — PHARMACY-ADMISSION MEDICATION HISTORY
Admission Medication History Completed by Pharmacy    See [facility] Admission Navigator for allergy information, preferred outpatient pharmacy, prior to admission medications and immunization status.     Medication History Sources:     Surescripts (fill history), CareEverywhere, and patient interview (via iPad)    Changes made to PTA medication list (reason):    Added: per patient and CareEverywhere  o Myrbetriq - patient started on a trial sample by urologist per clinic note on 11/4/21    Deleted: None    Changed: None    Additional Information:    Patient reported last doses of most medications was approximately 2 weeks ago; last took sumatriptan for a migraine yesterday.    Patient reports he will need to use a CPAP during admission.    Prior to Admission medications    Medication Sig Last Dose Taking? Auth Provider   ARIPiprazole (ABILIFY) 10 MG tablet Take 10 mg by mouth daily  Past Month at Unknown time Yes Reported, Patient   dexmethylphenidate (FOCALIN XR) 15 MG 24 hr capsule Take 15 mg by mouth daily Past Month at Unknown time Yes Unknown, Entered By History   ergocalciferol (ERGOCALCIFEROL) 1.25 MG (65830 UT) capsule Take 50,000 Units by mouth once a week Vitamin D2 Past Month at Unknown time Yes Reported, Patient   magnesium (SM MAGNESIUM) 250 MG tablet Take 250 mg by mouth daily  Past Month at Unknown time Yes Reported, Patient   mirabegron (MYRBETRIQ) 25 MG 24 hr tablet Take 25 mg by mouth daily Past Month at Unknown time Yes Unknown, Entered By History   nortriptyline (PAMELOR) 25 MG capsule Take 25 mg by mouth At Bedtime  Past Month at Unknown time Yes Reported, Patient   omeprazole (PRILOSEC) 40 MG DR capsule Take 40 mg by mouth daily  Past Month at Unknown time Yes Reported, Patient   riboflavin (VITAMIN  B2) 25 MG TABS Take 50 mg by mouth daily  Past Month at Unknown time Yes Reported, Patient   rosuvastatin (CRESTOR) 5 MG tablet Take 5 mg by mouth At Bedtime  Past Month at Unknown time Yes Reported,  Patient   SUMAtriptan (IMITREX) 100 MG tablet Take 100 mg by mouth at onset of headache for migraine  12/24/2021 at Unknown time Yes Reported, Patient   verapamil ER (VERELAN) 120 MG 24 hr capsule Take 120 mg by mouth daily  Past Month at Unknown time Yes Reported, Patient   vortioxetine (TRINTELLIX) 20 MG tablet Take 20 mg by mouth daily  Past Month at Unknown time Yes Reported, Patient   ondansetron (ZOFRAN) 8 MG tablet Take 1 tablet (8 mg) by mouth every 8 hours as needed for nausea   Sandrine Abreu APRN CNP   tiZANidine (ZANAFLEX) 2 MG tablet Take 2 mg by mouth as needed  Patient not taking: Reported on 8/27/2021   Reported, Patient       Date completed: 12/25/21    Medication history completed by:     Nicollette McMann, Joanna  [facility] Building: Ascom *[id number]

## 2021-12-25 NOTE — SAFE
Srinivas Young  December 25, 2021  SAFE Note    Critical Safety Issues: SI with increased intensity and frequency, hopelessness, lack of forward thinking, non compliant with medication      Current Suicidal Ideation/Self-Injurious Concerns/Methods: Cutting and Jumping (from building, into traffic, etc.)      Current or Historical Inappropriate Sexual Behavior: No      Current or Historical Aggression/Homicidal Ideation: None - N/A      Triggers: relationship of 4 years ended, hx of chronic depression, poor compliance with medication    Updated care team: Yes: Dr. Perrin and Inpt intake    For additional details see full LMHP assessment.       Jyoti Burns, LASHASW

## 2021-12-25 NOTE — ED NOTES
[facility] ED Mental Health Handoff Note:       Brief HPI:  This is a 41 year old male signed out to me by Dr. Perrin.  See initial ED Provider note for full details of the presentation.     Home meds reviewed and ordered/administered: Yes    Medically stable for inpatient mental health admission: Yes.    Evaluated by mental health: Yes. The recommendation is for inpatient mental health treatment. Bed search in process    Safety concerns: At the time I received sign out, there were no safety concerns.    Hold Status:  Active Orders   N/A            Exam:   Patient Vitals for the past 24 hrs:   BP Temp Temp src Pulse Resp SpO2 Weight   12/25/21 1943 (!) 144/95 -- -- 72 16 96 % --   12/25/21 1038 -- -- -- -- -- -- 88.5 kg (195 lb)   12/25/21 1031 (!) 138/95 97.5  F (36.4  C) Oral 74 16 98 % --             ED Course:    Medications   SUMAtriptan (IMITREX) tablet 100 mg (100 mg Oral Given 12/25/21 1944)       ED Course as of 12/25/21 2350   Sat Dec 25, 2021   1557 Patient   1646 [facility] went to assess patient and found him to be very ambivalent abut actually leaving. Just expressed impatience with wait in ED. Does not have solid disposition if he were to discharge from ED so don't think he is truly motivated to leave. Plan to continue the course with planned psych admission and call [facility] for reassessment if he tries to actively leave.       There were no significant events during my shift.    Patient was signed out to the oncoming provider, Dr. Jimenez.      Impression:    ICD-10-CM    1. Suicidal ideation  R45.851        Plan:    1. Awaiting inpatient mental health admission/transfer.      RESULTS:   Results for orders placed or performed during the hospital encounter of 12/25/21 (from the past 24 hour(s))   Drug abuse screen 6 urine (chem dep) ([facility])     Status: Abnormal    Collection Time: 12/25/21 10:50 AM   Result Value Ref Range    Amphetamines Urine Screen Negative Screen Negative    Barbiturates Urine Screen  Negative Screen Negative    Benzodiazepines Urine Screen Negative Screen Negative    Cannabinoids Urine Screen Positive (A) Screen Negative    Cocaine Urine Screen Negative Screen Negative    Ethanol Urine Screen Negative Screen Negative    Opiates Urine Screen Negative Screen Negative   Asymptomatic COVID-19 Virus (Coronavirus) by PCR Nasopharyngeal     Status: Normal    Collection Time: 12/25/21 12:16 PM    Specimen: Nasopharyngeal; Swab   Result Value Ref Range    SARS CoV2 PCR Negative Negative    Narrative    Testing was performed using the patricia  SARS-CoV-2 & Influenza A/B Assay on the patricia  [employer]  System.  This test should be ordered for the detection of SARS-COV-2 in individuals who meet SARS-CoV-2 clinical and/or epidemiological criteria. Test performance is unknown in asymptomatic patients.  This test is for in vitro diagnostic use under the FDA EUA for laboratories certified under CLIA to perform moderate and/or high complexity testing. This test has not been FDA cleared or approved.  A negative test does not rule out the presence of PCR inhibitors in the specimen or target RNA in concentration below the limit of detection for the assay. The possibility of a false negative should be considered if the patient's recent exposure or clinical presentation suggests COVID-19.  [facility] [employer] are certified under the Clinical Laboratory Improvement Amendments of 1988 (CLIA-88) as qualified to perform moderate and/or high complexity laboratory testing.             MD Dash Ventura Jill C, MD  12/25/21 4100

## 2021-12-25 NOTE — TELEPHONE ENCOUNTER
S DEC called to give clinical on 41/M [facility]    B Increase in SI after ending of relationship, worst ever been. HX of chronic severe depression, on meds for several years but not consistent for past two months. Hopeless, anxious, poor sleep, lack of appetite. DX of bipolar 2. Can't engage in safety planning. Plan of using knife or jumping off bridge, no prev SA. No SIB. No AH, denies HI and aggression. Says meds make nausea, RX of focoline, abilify, noratriptaline, trintilix. HX of substance use and treatment, only THC no other substances. Ambulatory, eating drinking. No major medical concerns.     A Vol; metro preferred.     R In [facility] awaiting placement.     Patient cleared and ready for behavioral bed placement: Yes

## 2021-12-25 NOTE — ED PROVIDER NOTES
ED Provider Note  [facility]      History     Chief Complaint   Patient presents with     Suicidal     SI with plan to stab self with aknife     The history is provided by the patient and medical records.     Srinivas Young is a 41 year old male with a past medical history significant for chronic migraines and depression who resents to the ED for an evaluation of suicidal ideation.  Patient reports that his girlfriend broke up with him this morning and he started to feel suicidal.  Patient denies any history of suicidal attempts but states he has a long history of depression since he was 11 years old.  He was last hospitalized for his depression in July 2021.  He states he has continued transcranial magnetic therapy, and DBT therapy.  Patient reports he has not been taking his medications for the past 2 months because he gets nauseated from them.  Patient reports that he uses marijuana once a month but denies alcohol use.    Past Medical History  Past Medical History:   Diagnosis Date     Chronic migraine w/o aura w/o status migrainosus, not intractable      Depression      Depressive disorder      Fatty liver      Prolonged Q-T interval on ECG      Past Surgical History:   Procedure Laterality Date     ENDOSCOPY DRUG INDUCED SLEEP N/A 8/25/2021    Procedure: DRUG-INDUCED SLEEP ENDOSCOPY;  Surgeon: Dolly Dominguez MD;  Location: [facility] OR     no surgical history       ARIPiprazole (ABILIFY) 10 MG tablet  dexmethylphenidate (FOCALIN) 10 MG tablet  ergocalciferol (ERGOCALCIFEROL) 1.25 MG (99508 UT) capsule  magnesium (SM MAGNESIUM) 250 MG tablet  nortriptyline (PAMELOR) 25 MG capsule  omeprazole (PRILOSEC) 40 MG DR capsule  ondansetron (ZOFRAN) 8 MG tablet  riboflavin (VITAMIN  B2) 25 MG TABS  rosuvastatin (CRESTOR) 5 MG tablet  SUMAtriptan (IMITREX) 100 MG tablet  tiZANidine (ZANAFLEX) 2 MG tablet  verapamil ER (VERELAN) 120 MG 24 hr capsule  vortioxetine (TRINTELLIX) 20 MG  tablet      No Known Allergies  Family History  Family History   Problem Relation Age of Onset     Depression Mother      Depression Father      Depression Paternal Grandmother      Arrhythmia Brother         prolonged QT     Arrhythmia Sister         prolonged QT     Social History   Social History     Tobacco Use     Smoking status: Never Smoker     Smokeless tobacco: Never Used   Substance Use Topics     Alcohol use: Yes     Comment: daily- 1 or 2      Drug use: Yes     Types: Marijuana      Past medical history, past surgical history, medications, allergies, family history, and social history were reviewed with the patient. No additional pertinent items.       Review of Systems   Constitutional: Negative for fever.   HENT: Negative for congestion.    Eyes: Negative for redness.   Respiratory: Negative for shortness of breath.    Cardiovascular: Negative for chest pain.   Gastrointestinal: Negative for abdominal pain.   Genitourinary: Negative for difficulty urinating.   Musculoskeletal: Negative for arthralgias and neck stiffness.   Skin: Negative for color change.   Neurological: Negative for headaches.   Psychiatric/Behavioral: Positive for suicidal ideas. Negative for confusion.     A complete review of systems was performed with pertinent positives and negatives noted in the HPI, and all other systems negative.    Physical Exam   BP: (!) 138/95  Pulse: 74  Temp: 97.5  F (36.4  C)  Resp: 16  Weight: 88.5 kg (195 lb)  SpO2: 98 %  Physical Exam  Constitutional:       General: He is not in acute distress.     Appearance: He is not diaphoretic.   HENT:      Head: Atraumatic.   Eyes:      General: No scleral icterus.     Pupils: Pupils are equal, round, and reactive to light.   Cardiovascular:      Heart sounds: Normal heart sounds.   Pulmonary:      Effort: No respiratory distress.      Breath sounds: Normal breath sounds.   Abdominal:      General: Bowel sounds are normal.      Palpations: Abdomen is soft.       Tenderness: There is no abdominal tenderness.   Musculoskeletal:         General: No tenderness.   Skin:     General: Skin is warm.      Findings: No rash.   Psychiatric:         Attention and Perception: Attention normal.         Mood and Affect: Mood is depressed.         Speech: Speech normal.         Behavior: Behavior normal. Behavior is cooperative.         Thought Content: Thought content is not paranoid or delusional. Thought content includes suicidal ideation. Thought content does not include homicidal ideation. Thought content includes suicidal plan. Thought content does not include homicidal plan.         ED Course      Procedures       The medical record was reviewed and interpreted.  Current labs reviewed and interpreted.  Previous labs reviewed and interpreted.  Mental Health Risk Assessment      PSS-3    Date and Time Over the past 2 weeks have you felt down, depressed, or hopeless? Over the past 2 weeks have you had thoughts of killing yourself? Have you ever attempted to kill yourself? When did this last happen? User   12/25/21 1038 yes yes no -- MAW      C-SSRS ([place])    Date and Time Q1 Wished to be Dead (Past Month) Q2 Suicidal Thoughts (Past Month) Q3 Suicidal Thought Method Q4 Suicidal Intent without Specific Plan Q5 Suicide Intent with Specific Plan Q6 Suicide Behavior (Lifetime) Within the Past 3 Months? RETIRED: Level of Risk per Screen Screening Not Complete User   12/25/21 1038 yes yes yes no no yes -- -- -- MAW              Suicide assessment completed by mental health (D.E.C., LCSW, etc.)       Results for orders placed or performed during the hospital encounter of 12/25/21   Drug abuse screen 6 urine (chem dep) ([facility])     Status: Abnormal   Result Value Ref Range    Amphetamines Urine Screen Negative Screen Negative    Barbiturates Urine Screen Negative Screen Negative    Benzodiazepines Urine Screen Negative Screen Negative    Cannabinoids Urine Screen Positive (A) Screen Negative     Cocaine Urine Screen Negative Screen Negative    Ethanol Urine Screen Negative Screen Negative    Opiates Urine Screen Negative Screen Negative   Asymptomatic COVID-19 Virus (Coronavirus) by PCR Nasopharyngeal     Status: Normal    Specimen: Nasopharyngeal; Swab   Result Value Ref Range    SARS CoV2 PCR Negative Negative    Narrative    Testing was performed using the patricia  SARS-CoV-2 & Influenza A/B Assay on the patricia  Apolonia  System.  This test should be ordered for the detection of SARS-COV-2 in individuals who meet SARS-CoV-2 clinical and/or epidemiological criteria. Test performance is unknown in asymptomatic patients.  This test is for in vitro diagnostic use under the FDA EUA for laboratories certified under CLIA to perform moderate and/or high complexity testing. This test has not been FDA cleared or approved.  A negative test does not rule out the presence of PCR inhibitors in the specimen or target RNA in concentration below the limit of detection for the assay. The possibility of a false negative should be considered if the patient's recent exposure or clinical presentation suggests COVID-19.  [facility] are certified under the Clinical Laboratory Improvement Amendments of 1988 (CLIA-88) as qualified to perform moderate and/or high complexity laboratory testing.     Medications - No data to display     Assessments & Plan (with Medical Decision Making)   41-year-old male with history of depression who presents with increasing suicidal ideation.  Differential includes mental illness, substance abuse, malingering, medication side effect.  Exam reveals depressed mood with suicidal ideation and plan.  Urine toxicology is positive for cannabinoids.  The case was discussed at length with the behavioral , please see separate note.  Given acute suicidal ideation with plan, patient will be admitted to mental health for further evaluation and management.    I have reviewed the nursing  notes. I have reviewed the findings, diagnosis, plan and need for follow up with the patient.    New Prescriptions    No medications on file       Final diagnoses:   Suicidal ideation       --  I, Meredith Lou, am serving as a trained medical scribe to document services personally performed by Eulogio Perrin MD, based on the provider's statements to me.     IEulogio MD, was physically present and have reviewed and verified the accuracy of this note documented by Meredith Lou.    Eulogio Perrin MD  [facility], located within [facility] EMERGENCY DEPARTMENT  12/25/2021     Eulogio Perrin MD  12/25/21 8060

## 2021-12-26 VITALS
TEMPERATURE: 97.9 F | DIASTOLIC BLOOD PRESSURE: 95 MMHG | OXYGEN SATURATION: 95 % | RESPIRATION RATE: 16 BRPM | BODY MASS INDEX: 28.8 KG/M2 | WEIGHT: 195 LBS | SYSTOLIC BLOOD PRESSURE: 131 MMHG | HEART RATE: 86 BPM

## 2021-12-26 LAB
ATRIAL RATE - MUSE: 55 BPM
ATRIAL RATE - MUSE: 67 BPM
DIASTOLIC BLOOD PRESSURE - MUSE: NORMAL MMHG
DIASTOLIC BLOOD PRESSURE - MUSE: NORMAL MMHG
INTERPRETATION ECG - MUSE: NORMAL
INTERPRETATION ECG - MUSE: NORMAL
P AXIS - MUSE: 30 DEGREES
P AXIS - MUSE: 47 DEGREES
PR INTERVAL - MUSE: 114 MS
PR INTERVAL - MUSE: 118 MS
QRS DURATION - MUSE: 90 MS
QRS DURATION - MUSE: 98 MS
QT - MUSE: 432 MS
QT - MUSE: 454 MS
QTC - MUSE: 434 MS
QTC - MUSE: 456 MS
R AXIS - MUSE: 35 DEGREES
R AXIS - MUSE: 63 DEGREES
SYSTOLIC BLOOD PRESSURE - MUSE: NORMAL MMHG
SYSTOLIC BLOOD PRESSURE - MUSE: NORMAL MMHG
T AXIS - MUSE: 36 DEGREES
T AXIS - MUSE: 57 DEGREES
VENTRICULAR RATE- MUSE: 55 BPM
VENTRICULAR RATE- MUSE: 67 BPM

## 2021-12-26 PROCEDURE — 250N000009 HC RX 250: Performed by: EMERGENCY MEDICINE

## 2021-12-26 PROCEDURE — 250N000013 HC RX MED GY IP 250 OP 250 PS 637: Performed by: EMERGENCY MEDICINE

## 2021-12-26 RX ADMIN — LIDOCAINE HYDROCHLORIDE 30 ML: 20 SOLUTION OROPHARYNGEAL at 01:20

## 2021-12-26 NOTE — ED NOTES
Pt aware that he can not leave right now.  Updated that he has to see .   is busy and pt was told she would see him ASAP.  Pt aware that because he was suicidal he can not leave without MD allowing it.

## 2021-12-26 NOTE — TELEPHONE ENCOUNTER
R:   2351 - On call provider, Efren accepts for [facility]. Placed pt in queue. 2353 - Notified unit. Report at 1AM. 2357 - Notified ED.   UPDATE:  0551 - Received call from SAV Flores stating that all admissions to [facility] must be cancelled at this time d/t short staffing for today. Sandra will have someone update intake later this morning if admissions can resume.   0556 - RN on 2800 aware that admission has been cancelled at this time and pt removed from queue  0557 - Called ED to update that admission to 2800 cancelled and intake is to seek alternative placement    Per chart review, pt prefers to remain in the [place] for placement. Bed search update @ 0615:  [place] @ cap  [facility]: @ cap per website  [facility]:@ cap per website  [facility]: @ cap per website  [facility]: @ cap per website  [facility]: @ cap per website  [facility]: @ cap per website    Pt remains on work list until appropriate placement is available

## 2021-12-26 NOTE — ED NOTES
Patient complains of chest pain, pt verbalizes unsure if it is due to anxiety or cardiac in origin. Patient endorses localized dull pain. MD darnell

## 2021-12-26 NOTE — ED NOTES
[facility] Crisis Reassessment      Srinivas Young was reassessed at the request of Dr Carpio and pt's request for the following reasons: states he can commit to safety. Pt was first seen on 12/25/2021  by [facility]UTIERREZ[facility] ; see the initial assessment note for details.      Patient Presentation    Initial ED presentation details: The pt came to [facility] via friends/family due to concerns for suicidal ideation with the plan to use a knife or jump off a bridge.  The pt has historical diagnoses of Major Depressive Disorder, Bipolar II Disorder and PTSD.  The pt reported that he had not been taking his medications regularly for the previous two months.  States that his long term girlfriend, who is the mother of his 1 yo child, broke up with on 12/25/21.  Stated that he lived with is girlfriend.  The pt had Inpatient mental health hospitalization was offered and the pt agreed to admission.      Current patient presentation: The pt denies current suicidal ideation, plan and intent.  States he has spoken to his therapist, [employer] teacher and friend in [place].  States that he has completed pages in his DBT book.  States he would like to be discharged.  States he can commit to safety outside of the hospital..  States he plans to fly to [place] and stay with his friend in [facility].  States he has an appointment with his therapist on 12/29/21 and will be able to see this provider vis Telemedicine.          Changes observed since initial assessment: The pt appears with a decrease in dysphoric mood.  The pt is engaged in this reassessment.        Risk of Harm    Is the patient experiencing current suicidal ideation: No    Does the patient have thoughts of harming others? No      Mental Status Exam   Affect: Flat   Appearance: Appropriate    Attention Span/Concentration: Attentive?    Eye Contact: Engaged   Fund of Knowledge: Appropriate    Language /Speech Content: Fluent   Language /Speech Volume: Normal   "  Language /Speech Rate/Productions: Normal    Recent Memory: Intact   Remote Memory: Intact   Mood: Other: Mildly dysphoric     Orientation to Person: Yes    Orientation to Place: Yes   Orientation to Time of Day: Yes    Orientation to Date: Yes    Situation (Do they understand why they are here?): Yes    Psychomotor Behavior: Normal    Thought Content: Clear   Thought Form: Intact       Additional Collateral Information   Spoke with the pt's ex-girlfriend, Sierra Jarvis at [phone].  Sierra states that she has spoken to the pt several times today and states that he appears to be in a better place today.  States that he is planning on flying to [place] to spend time with a man who the pt considers to be his \"second father.\"  Sierra states she brought the pt to the hospital yesterday.  Sierra states she does not have safety concerns for the pt.        Therapeutic Intervention  The following therapeutic methodologies were employed when working with the patient: Establishing rapport, Active listening, Assess dimensions of crisis, Identify additional supports and alternative coping skills, Establish a discharge plan, and Safety planning. Patient response to intervention: Pt engaged in assessment.  .      Disposition  Recommended disposition: Individual Therapy and Medication Management      Reviewed case and recommendations with attending provider. Attending Name: Dr Radha Carpio      Attending concurs with disposition: Yes      Patient concurs with disposition: Yes      Final disposition: Individual therapy  and Medication management.       Clinical Substantiation of Recommendations  The pt has been reassessed upon his request as he states he is no longer suicidal.  Pt denies suicidal and homicidal ideation, plan and intent.  States that he is feeling more hopeful today as he has spoken to his [employer] teacher, therapist and friend in [place].  States he was upset and sad as his relationship with his " "girlfriend ended yesterday.  States, that with his ex-girlfriend's support, going to fly to [place] tonight to spend time with a person whom he considers his \"second father.\"  States he has an appointment with his therapist on 12/29/21.  The pt will be discharged upon his request and is deemed not holdable against his will as he is denies suicidal and homicidal ideation, plan and intent.  The recommendation is for the pt to follow up with outpatient therapist and psychiatrist.        Assessment Details  Total duration spent on the patient case in minutes: .75 hrs     CPT code(s) utilized: 83369 - Psychotherapy for Crisis (Each additional 30 minutes) - 30 min        Vidhya Sousa [facility]       AFTERCARE PLAN      If I am feeling unsafe or I am in a crisis, I will:   Contact my established care providers   Call the National Suicide Prevention Lifeline: [phone]   Go to the nearest emergency room   Call 801          Warning signs that I or other people might notice when a crisis is developing for me:   -catastrophic thinking  -suicidal plan  -relationship conflict  -not exercising  -not meditating    Things I am able to do on my own to cope or help me feel better:   [place]  -listen to music  -go to [facility]  -light a candle  -talk to my therapist  -do something for someone else  -go through DBT book    Things that I am able to do with others to cope or help me better:   -go to museums  -go to [employer]  -go for a drive  -go for a hike  -do an art project      Things I can use or do for distraction:   -Call [facility]  -go outside  -go to library  -use essential oil  -give someone a hug    Changes I can make to support my mental health and wellness:   -keep all appointments  -continue to cut down on marijuana use    People in my life that I can ask for help:   -Jose Posada, my therapist  -An Ann    Your [facility] has a mental health crisis team you can call 24/7:   Jancie López " "Crisis (COPE) [phone]    Other things that are important when I m in crisis:   -let others know when I am in a crisis    Additional resources and information:     It is recommended that you follow up with your outpatient therapist and psychiatric providers.  You have mentioned that you have an appointment with your therapist on 12/29/21.    PLEASE RETURN TO THE HOSPITAL IF YOUR SYMPTOMS WORSEN!!    Crisis Lines  Crisis Text Line  Text 126558  You will be connected with a trained live crisis counselor to provide support.    National Hope Line  1.800.SUICIDE [1151511]      Community Resources  Fast Tracker  Linking people to mental health and substance use disorder resources  fastSpongeFish.org     [place] Mental Health Warm Line  Peer to peer support  Monday thru Saturday, 12 pm to 10 pm  601.175.1034 or 2.850.572.7335  Text \"Support\" to 89625    National [place] [employer] Mental Illness (KAREN)  785.139.9081 or 1.888.KAREN.HELPS        Mental Health Apps  My3  https://my3app.org/    VirtualHopeBox  https://[employer].org/apps/virtual-hope-box/        "

## 2021-12-26 NOTE — ED NOTES
Slept during the night shift, no behaviors observed.  Pt remains on 1:1 observation.  Waiting for bed placement.

## 2021-12-26 NOTE — ED NOTES
[facility] ED Mental Health Handoff Note:       Brief HPI:  This is a 41 year old male signed out to me by Dr. Jimenez.  See initial ED Provider note for full details of the presentation.  The patient is a 41-year-old male who presents to the emergency department with suicidal ideation.  He is awaiting inpatient mental health placement.  Plan for transfer to [facility]. Pt did have some chest pain yesterday, EKG was normal.     Home meds reviewed and ordered/administered: No, pt has not taken his medications for the past month    Medically stable for inpatient mental health admission: Yes.    Evaluated by mental health: Yes. The recommendation is for inpatient mental health treatment. Bed secured and awaiting admission/transfer to [facility].    Safety concerns: At the time I received sign out, there were no safety concerns.    Hold Status:  Active Orders   N/A            Exam:   Patient Vitals for the past 24 hrs:   BP Temp Temp src Pulse Resp SpO2 Weight   12/26/21 0725 131/88 97.9  F (36.6  C) Oral 63 16 96 % --   12/26/21 0055 (!) 131/93 -- -- 67 18 97 % --   12/25/21 1943 (!) 144/95 -- -- 72 16 96 % --   12/25/21 1038 -- -- -- -- -- -- 88.5 kg (195 lb)   12/25/21 1031 (!) 138/95 97.5  F (36.4  C) Oral 74 16 98 % --             ED Course:    Medications   SUMAtriptan (IMITREX) tablet 100 mg (100 mg Oral Given 12/25/21 1944)   lidocaine (viscous) (XYLOCAINE) 2 % 15 mL, alum & mag hydroxide-simethicone (MAALOX) 15 mL GI Cocktail (30 mLs Oral Given 12/26/21 0120)       ED Course as of 12/26/21 0735   Sat Dec 25, 2021   1557 Patient   1646 [facility] went to assess patient and found him to be very ambivalent abut actually leaving. Just expressed impatience with wait in ED. Does not have solid disposition if he were to discharge from ED so don't think he is truly motivated to leave. Plan to continue the course with planned psych admission and call [facility] for reassessment if he tries to actively leave.       There were  "significant events during my shift.    Pt c/o achey ongoing chest pain. Had EKG last night. EKG repeated here.          EKG Interpretation:      Interpreted by Radha Carpio MD  Time reviewed:0810   Symptoms at time of EKG: chest pain    Rhythm: normal sinus   Rate: normal, 67  Axis: NORMAL  Ectopy: none  Conduction: normal  ST Segments/ T Waves: No ST-T wave changes  Q Waves: none  Comparison to prior: Unchanged from earlier today    Clinical Impression: normal EKG    After some time in the emergency department, patient states he would like to leave and be discharged home instead of being admitted for inpatient mental health treatment.  The patient reports that his girlfriend has agreed to come pick him up at the emergency department and give him a ride to the airport, where he plans to take a flight to see an individual who he identifies as \"like a father\" to him.  The patient states he no longer feels suicidal and does not have any plan or intention to harm himself at this time.  He is able to safety plan with the mental health , who performed a repeat dec assessment on him in the emergency department at this time.  There is no indication for an involuntary hold at this time.  Patient is aware that he may return to the emergency department at any time should he change his mind and should do so if he feels he is in danger of harming himself or others.    Patient to be discharged home. Advised to follow up with PCP and his mental health providers as directed. To return to ER immediately with any new/worsening symptoms. Plan of care discussed with patient who expresses understanding and agrees with plan of care.      Impression:    ICD-10-CM    1. Suicidal ideation  R45.851        Plan:    1. Awaiting inpatient mental health admission/transfer.      RESULTS:   Results for orders placed or performed during the hospital encounter of 12/25/21 (from the past 24 hour(s))   Drug abuse screen 6 urine (chem " romario) ([facility])     Status: Abnormal    Collection Time: 12/25/21 10:50 AM   Result Value Ref Range    Amphetamines Urine Screen Negative Screen Negative    Barbiturates Urine Screen Negative Screen Negative    Benzodiazepines Urine Screen Negative Screen Negative    Cannabinoids Urine Screen Positive (A) Screen Negative    Cocaine Urine Screen Negative Screen Negative    Ethanol Urine Screen Negative Screen Negative    Opiates Urine Screen Negative Screen Negative   Asymptomatic COVID-19 Virus (Coronavirus) by PCR Nasopharyngeal     Status: Normal    Collection Time: 12/25/21 12:16 PM    Specimen: Nasopharyngeal; Swab   Result Value Ref Range    SARS CoV2 PCR Negative Negative    Narrative    Testing was performed using the patricia  SARS-CoV-2 & Influenza A/B Assay on the patricia  Apolonia  System.  This test should be ordered for the detection of SARS-COV-2 in individuals who meet SARS-CoV-2 clinical and/or epidemiological criteria. Test performance is unknown in asymptomatic patients.  This test is for in vitro diagnostic use under the FDA EUA for laboratories certified under CLIA to perform moderate and/or high complexity testing. This test has not been FDA cleared or approved.  A negative test does not rule out the presence of PCR inhibitors in the specimen or target RNA in concentration below the limit of detection for the assay. The possibility of a false negative should be considered if the patient's recent exposure or clinical presentation suggests COVID-19.  [facility] are certified under the Clinical Laboratory Improvement Amendments of 1988 (CLIA-88) as qualified to perform moderate and/or high complexity laboratory testing.             MD Balaji Cobb Michelle C, MD  12/26/21 0736       Radha Carpio MD  12/26/21 0758       Radha Carpio MD  12/26/21 0811       Radha Carpio MD  12/26/21 1503

## 2021-12-26 NOTE — DISCHARGE INSTRUCTIONS
If I am feeling unsafe or I am in a crisis, I will:   Contact my established care providers   Call the National Suicide Prevention Lifeline: 424.111.1142   Go to the nearest emergency room   Call 911     TODAY'S VISIT:  You were seen today for suicidal thoughts  -   - If you had any labs or imaging/radiology tests performed today, you should also discuss these tests with your usual provider.     FOLLOW-UP:  Please make an appointment to follow up with:  - Your Primary Care Provider. If you do not have a PCP, please call the Primary Care Center (phone: [phone] for an appointment  -  mental health resources as directed     - Have your provider review the results from today's visit with you again to make sure no further follow-up or additional testing is needed based on those results.     RETURN TO THE EMERGENCY DEPARTMENT  Return to the Emergency Department at any time for any new or worsening symptoms or any concerns.           Warning signs that I or other people might notice when a crisis is developing for me:   -catastrophic thinking  -suicidal plan  -relationship conflict  -not exercising  -not meditating    Things I am able to do on my own to cope or help me feel better:   [place]  -listen to music  -go to [employer]  -light a candle  -talk to my therapist  -do something for someone else  -go through DBT book    Things that I am able to do with others to cope or help me better:   -go to museums  -go to [employer]  -go for a drive  -go for a hike  -do an art project  -    Things I can use or do for distraction:   -Call [facility]  -go outside  -go to library  -use essential oil  -give someone a hug    Changes I can make to support my mental health and wellness:   -keep all appointments  -continue to cut down on marijuana use    People in my life that I can ask for help:   -Jose Posada, my therapist  -An Ann    [facility] has a mental health crisis team you can call 24/7:  "  [facility] ([facility]) [phone]    Other things that are important when I m in crisis:   -let others know when I am in a crisis    Additional resources and information:     It is recommended that you follow up with your outpatient therapist and psychiatric providers.  You have mentioned that you have an appointment with your therapist on 12/29/21.    PLEASE RETURN TO THE HOSPITAL IF YOUR SYMPTOMS WORSEN!!    Crisis Lines  Crisis Text Line  Text 781181  You will be connected with a trained live crisis counselor to provide support.    National Hope Line  1.800.SUICIDE [0196014]      Community Resources  Fast Tracker  Linking people to mental health and substance use disorder resources  fastClearStreamckCodacyn.org     [place] Mental Health Warm Line  Peer to peer support  Monday thru Saturday, 12 pm to 10 pm  334.121.5478 or 9.228.422.7817  Text \"Support\" to 98834    National [place] on Mental Illness (KAREN)  897.705.7740 or 1.888.KAREN.HELPS        Mental Health Apps  My3  https://my[employer] Systemspp.org/    VirtualHopeBox  https://[employer].org/apps/virtual-hope-box/      "

## 2021-12-27 ENCOUNTER — DOCUMENTATION ONLY (OUTPATIENT)
Dept: PSYCHIATRY | Facility: CLINIC | Age: 41
End: 2021-12-27
Payer: MEDICARE

## 2021-12-27 DIAGNOSIS — F33.2 SEVERE EPISODE OF RECURRENT MAJOR DEPRESSIVE DISORDER, WITHOUT PSYCHOTIC FEATURES (H): Primary | ICD-10-CM

## 2021-12-27 NOTE — PROGRESS NOTES
Behavioral Activation Clinician Contact & Progress Note  A Part of the [facility] Treatment Resistant Depression Program    Client: Srinivas Young (1980)     MRN: [id number]  Date:  12/15/21  Diagnosis: major depressive disorder  Clinician: CAROLINE Warren     People present:   Writer  Client: Srinivas Young  Others: none    Mode of communication: [employer] Well (HIPAA compliant, secure platform). Patient consented verbally to this mode of therapy today.  Reason for telehealth: COVID-19. This patient visit was converted to a telehealth visit to minimize exposure to COVID-19.    Originating Location (patient location): home, located in [place], [place]  Distant Location (provider location): Home office, located in [place], [place], using appropriate privacy considerations and procedures    Session: 2 of unknown  Length of session:  Start time: 1:00, End time: 1:56    Did the client complete the activation assignment(s) from the previous session: not completed     Behavioral activation strategies utilized:  -Identify activation targets for goal setting (pleasant, valued, goal directed)  -Assign out of session activation assignments  -Monitor completion and problem-solve assignment failure (stimulus control interventions, skills training, contingency management, acceptance/avoidance)    Mental Status Exam  Alertness: alert  and oriented  Behavior/Demeanor: cooperative and agitated  Speech: normal and regular rate and rhythm  Language: intact and no problems.   Mood: depressed and worried  Thought Process/Associations: perseverative  Thought Content:  Reports suicidal ideation without plan; without intent [details in Interim History];  Denies violent ideation and delusions  Perception:  Reports none;  Denies auditory hallucinations and visual hallucinations  Insight: fair  Judgment: adequate for safety  Cognition: does  appear grossly intact; formal cognitive testing was not done    Measures:  PHQ-9  Over the last 2  weeks, how often have you been bothered by any the following problems? See PHQ from today's visit with Dr Moeller      Assessment/Progress Note:     Srinivas reports worry and stress about demands of new job, in particular expectations of supervisor in the context of requesting significant work from him before he actually starts on January 1  Srinivas reports discord in his primary relationship, wonders how/if he can meet (perceived) expectations of perfection  Discussed adherent DBT program, Srinivas is interested and intends to call [facility] about intake apppointment    Plan/Referrals:     Call [facility] regarding DBT intake  Contact [facility] to join support group

## 2021-12-27 NOTE — PROGRESS NOTES
Behavioral Activation Clinician Contact & Progress Note  A Part of the [facility] Treatment Resistant Depression Program    Client: Srinivas Young (1980)     MRN: [id number]  Date:  12/22/21  Diagnosis: major depressive disorder  Clinician: CAROLINE Warren     People present:   Writer  Client: Srinivas Young  Others: none    Mode of communication: [employer] Well (HIPAA compliant, secure platform). Patient consented verbally to this mode of therapy today.  Reason for telehealth: COVID-19. This patient visit was converted to a telehealth visit to minimize exposure to COVID-19.    Originating Location (patient location): home, located in [place], [place]  Distant Location (provider location): Home office, located in [place], [place], using appropriate privacy considerations and procedures    Session: 2 of unknown  Length of session:  Start time: 1:00, End time: 1:55    Did the client complete the activation assignment(s) from the previous session: Partially Completed     Behavioral activation strategies utilized:  -Identify activation targets for goal setting (pleasant, valued, goal directed)  -Assign out of session activation assignments  -Monitor completion and problem-solve assignment failure (stimulus control interventions, skills training, contingency management, acceptance/avoidance)    Mental Status Exam  Alertness: alert  and oriented  Behavior/Demeanor: cooperative and agitated  Speech: normal and regular rate and rhythm  Language: intact and no problems.   Mood: depressed and worried  Thought Process/Associations: perseverative  Thought Content:  Reports suicidal ideation without plan; without intent [details in Interim History];  Denies violent ideation and delusions  Perception:  Reports none;  Denies auditory hallucinations and visual hallucinations  Insight: fair  Judgment: adequate for safety  Cognition: does  appear grossly intact; formal cognitive testing was not done    Measures:  PHQ-9  Over the last  2 weeks, how often have you been bothered by any the following problems? See PHQ from today's visit with Dr Moeller      Assessment/Progress Note:     Srinivas reports significant stress and worry about his primary relationship, that his partner asked for a 'break' but he doesn't have a place to stay at this time. He reports worry about how this may affect his daughter in the present and future    Srinivas reports worry and stress about finances, work, and social isolation, as well as big picture worries about political, cultural, and global problems, is struggling to find a balance of being informed without being overwhelmed    Srinivas reports SI with these increased stressors and states he does not have any intention to kill himself. Additionally, he has a DBT safety plan created with his most recent therapist that includes DBT skills, coping skills, whom to contact, crisis resources, and going to an ED. Reminded patient of [facility] unit and his positive visits there approximately six months ago    Plan/Referrals:     Use cognitive restructuring with ruminations, catastrophizing with focus on protective factors like daughter  Be purposeful about consuming news and social media and mindful about balancing positive content  Return visit in one week  Call [facility] regarding DBT intake  Reminder about [facility]      CAROLINE Warren

## 2021-12-27 NOTE — PROGRESS NOTES
"On 12/26/21 writer received a message via Facebook  Messenger to writer's personal Facebook account from patient:    \"Hi. Are you my therapist? I'm in  The ER at [facility]. There's not beds for my to go anywhere. Just trying to touch base with someone.\"     Writer consulted with supervisor and then responded to patient. \"Hi.  I can't correspond via Facebook. What I can do, though, is remind you about [facility] to see if you might be able to transfer there. If not, I encourage you to follow the recommendations of the ER providers who are taking care of you to keep you safe. Please be patient with finding a bed and be safe. I am sorry you are struggling right now.\"    One minute later writer received  \"OK\" I'm getting discharged. Flying to [facility].\"      "

## 2021-12-29 ENCOUNTER — VIRTUAL VISIT (OUTPATIENT)
Dept: PSYCHIATRY | Facility: CLINIC | Age: 41
End: 2021-12-29
Payer: MEDICARE

## 2021-12-29 DIAGNOSIS — F33.2 SEVERE EPISODE OF RECURRENT MAJOR DEPRESSIVE DISORDER, WITHOUT PSYCHOTIC FEATURES (H): Primary | ICD-10-CM

## 2021-12-29 NOTE — PROGRESS NOTES
Patient did not join video appointment. Sent a link to meeting at approximately 1:15 and left a voice mail message at 1:25.

## 2022-01-05 ENCOUNTER — VIRTUAL VISIT (OUTPATIENT)
Dept: PSYCHIATRY | Facility: CLINIC | Age: 42
End: 2022-01-05
Payer: MEDICARE

## 2022-01-05 DIAGNOSIS — F33.2 SEVERE EPISODE OF RECURRENT MAJOR DEPRESSIVE DISORDER, WITHOUT PSYCHOTIC FEATURES (H): Primary | ICD-10-CM

## 2022-01-12 ENCOUNTER — VIRTUAL VISIT (OUTPATIENT)
Dept: PSYCHIATRY | Facility: CLINIC | Age: 42
End: 2022-01-12
Payer: MEDICARE

## 2022-01-12 DIAGNOSIS — F33.2 SEVERE EPISODE OF RECURRENT MAJOR DEPRESSIVE DISORDER, WITHOUT PSYCHOTIC FEATURES (H): Primary | ICD-10-CM

## 2022-01-12 NOTE — PROGRESS NOTES
Behavioral Activation Clinician Contact & Progress Note  A Part of the [facility] Treatment Resistant Depression Program    Client: Srinivas Young (1980)     MRN: [id number]  Date:  1/05/22  Diagnosis: major depressive disorder  Clinician: CAROLINE Warren     People present:   Writer  Client: Srinivas Young  Others: none    Mode of communication: [employer] Well (HIPAA compliant, secure platform). Patient consented verbally to this mode of therapy today.  Reason for telehealth: COVID-19. This patient visit was converted to a telehealth visit to minimize exposure to COVID-19.    Originating Location (patient location): home, located in [place], [place]  Distant Location (provider location): Home office, located in [place], [place], using appropriate privacy considerations and procedures    Length of session:  Start time: 1:00, End time: 1:55    Did the client complete the activation assignment(s) from the previous session: Partially Completed     Behavioral activation strategies utilized:  -Identify activation targets for goal setting (pleasant, valued, goal directed)  -Assign out of session activation assignments  -Monitor completion and problem-solve assignment failure (stimulus control interventions, skills training, contingency management, acceptance/avoidance)    Mental Status Exam  Alertness: alert  and oriented  Behavior/Demeanor: cooperative and agitated  Speech: normal and regular rate and rhythm  Language: intact and no problems.   Mood: depressed and worried  Thought Process/Associations: perseverative  Thought Content:  Reports suicidal ideation without plan; without intent [details in Interim History];  Denies violent ideation and delusions  Perception:  Reports none;  Denies auditory hallucinations and visual hallucinations  Insight: fair  Judgment: adequate for safety  Cognition: does  appear grossly intact; formal cognitive testing was not done    Assessment/Progress Note:     Srinivas reports he flew to  "[place] after leaving the [facility] ED on peri (because he didn't have anywhere to stay) and stayed at a friend's apartment in [place] while friend was out of town. Returned to [place] and is back at home with his partner. He reports there are \"on ok terms\" at this time.    Srinivas reports he has been taking his psychotropic medications for the last few days, depsite    Srinivas reports SI with these increased stressors and states he does not have any intention to kill himself. Additionally, he has a DBT safety plan created with his most recent therapist that includes DBT skills, coping skills, whom to contact, crisis resources, and going to an ED. Reminded patient of [facility] unit and his positive visits there approximately six months ago    Plan/Referrals:     Use cognitive restructuring with ruminations, catastrophizing with focus on protective factors like daughter  Be purposeful about consuming news and social media and mindful about balancing positive content  Return visit in one week  Call [facility] regarding DBT intake  Reminder about [facility]      CAROLINE Warren   "

## 2022-01-21 ENCOUNTER — VIRTUAL VISIT (OUTPATIENT)
Dept: PSYCHIATRY | Facility: CLINIC | Age: 42
End: 2022-01-21
Payer: MEDICARE

## 2022-01-21 DIAGNOSIS — F41.1 GENERALIZED ANXIETY DISORDER: ICD-10-CM

## 2022-01-21 DIAGNOSIS — Z78.9 ALCOHOL USE: ICD-10-CM

## 2022-01-21 DIAGNOSIS — F33.2 SEVERE EPISODE OF RECURRENT MAJOR DEPRESSIVE DISORDER, WITHOUT PSYCHOTIC FEATURES (H): Primary | ICD-10-CM

## 2022-01-21 ASSESSMENT — PATIENT HEALTH QUESTIONNAIRE - PHQ9: SUM OF ALL RESPONSES TO PHQ QUESTIONS 1-9: 21
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
## 2022-01-21 NOTE — PROGRESS NOTES
Srinivas is a 41 year old who is being evaluated via a billable video visit.      How would you like to obtain your AVS? MyChart  If the video visit is dropped, the invitation should be resent by: via text [phone]  , Will anyone else be joining your video visit? No      Video Start Time: 1300  Video-Visit Details    Type of service:  Video Visit    Video End Time:1329    Originating Location (pt. Location): Home    Distant Location (provider location):  [facility]     Platform used for Video Visit: [employer]       Depression Response    Patient completed the PHQ-9 assessment for depression and scored >9? Yes  Question 9 on the PHQ-9 was positive for suicidality? Yes, patient see a nurse practitioner for this.  Does patient have current mental health provider? Yes, patient see a nurse practioner     Is this a virtual visit? Yes   Does patient have suicidal ideation (positive question 9)? Yes (adult) - transfer to Red Flag Triage ([phone]) Patient declined transfer.  Notify provider.     I personally notified the following: visit provider

## 2022-02-04 NOTE — PROGRESS NOTES
Behavioral Activation Clinician Contact & Progress Note  A Part of the [facility] Resistant Depression Program    Client: Srinivas Young (1980)     MRN: [id number]  Date:  1/12/22  Diagnosis: major depressive disorder  Clinician: CAROLINE Warren     People present:   Writer  Client: Srinivas Young  Others: none    Mode of communication: [employer] (HIPAA compliant, secure platform). Patient consented verbally to this mode of therapy today.  Reason for telehealth: COVID-19. This patient visit was converted to a telehealth visit to minimize exposure to COVID-19.    Originating Location (patient location): home, located in [place], [place]  Distant Location (provider location): Home office, located in [place], [place], using appropriate privacy considerations and procedures    Length of session:  Start time: 1:00, End time: 1:55    Did the client complete the activation assignment(s) from the previous session: Partially Completed     Behavioral activation strategies utilized:  -Identify activation targets for goal setting (pleasant, valued, goal directed)  -Assign out of session activation assignments  -Monitor completion and problem-solve assignment failure (stimulus control interventions, skills training, contingency management, acceptance/avoidance)    Mental Status Exam  Alertness: alert  and oriented  Behavior/Demeanor: cooperative and agitated  Speech: normal and regular rate and rhythm  Language: intact and no problems.   Mood: depressed and worried  Thought Process/Associations: perseverative  Thought Content:  Reports suicidal ideation without plan; without intent [details in Interim History];  Denies violent ideation and delusions  Perception:  Reports none;  Denies auditory hallucinations and visual hallucinations  Insight: fair  Judgment: adequate for safety  Cognition: does  appear grossly intact; formal cognitive testing was not done    Assessment/Progress Note:     Srinivas reports increased  stress related to starting his new job, both about returning to work after a hiatus and about the organization itself    Saw couples therapist after a break of several months, felt it went well overall    Srinivas a safety plan in place created with his most recent therapist that includes DBT skills, coping skills, whom to contact, crisis resources, and going to an ED. Reminded patient of [facility] unit and his positive visits there approximately six months ago    Plan/Referrals:     Use cognitive restructuring with ruminations, catastrophizing with focus on protective factors like daughter  Return visit in one week, or as needed  Call [facility] regarding DBT intake  Reminder about [facility]      CAROLINE Warren

## 2022-03-01 ENCOUNTER — TELEPHONE (OUTPATIENT)
Dept: PSYCHIATRY | Facility: CLINIC | Age: 42
End: 2022-03-01
Payer: MEDICARE

## 2022-03-01 NOTE — TELEPHONE ENCOUNTER
Patient is okay/doing DBS for the next six months and then after that patient would like a maintenance/check to see if patient can get back for TMS.

## 2022-03-28 PROBLEM — Z78.9 ALCOHOL USE: Status: ACTIVE | Noted: 2022-03-28

## 2022-03-28 PROBLEM — F33.2 SEVERE EPISODE OF RECURRENT MAJOR DEPRESSIVE DISORDER, WITHOUT PSYCHOTIC FEATURES (H): Status: ACTIVE | Noted: 2022-03-28

## 2022-03-28 PROBLEM — F41.1 GENERALIZED ANXIETY DISORDER: Status: ACTIVE | Noted: 2022-03-28

## 2022-06-07 ENCOUNTER — VIRTUAL VISIT (OUTPATIENT)
Dept: PSYCHIATRY | Facility: CLINIC | Age: 42
End: 2022-06-07
Payer: MEDICARE

## 2022-06-07 VITALS — WEIGHT: 185 LBS | HEIGHT: 69 IN | BODY MASS INDEX: 27.4 KG/M2

## 2022-06-07 DIAGNOSIS — F41.1 GENERALIZED ANXIETY DISORDER: ICD-10-CM

## 2022-06-07 DIAGNOSIS — Z78.9 ALCOHOL USE: ICD-10-CM

## 2022-06-07 DIAGNOSIS — F33.2 SEVERE EPISODE OF RECURRENT MAJOR DEPRESSIVE DISORDER, WITHOUT PSYCHOTIC FEATURES (H): Primary | ICD-10-CM

## 2022-06-07 DIAGNOSIS — F12.10 CANNABIS USE DISORDER, MILD, ABUSE: ICD-10-CM

## 2022-06-07 RX ORDER — GALCANEZUMAB 120 MG/ML
INJECTION, SOLUTION SUBCUTANEOUS
COMMUNITY
Start: 2022-06-06

## 2022-06-07 ASSESSMENT — PATIENT HEALTH QUESTIONNAIRE - PHQ9: SUM OF ALL RESPONSES TO PHQ QUESTIONS 1-9: 20

## 2022-06-07 NOTE — PROGRESS NOTES
Srinivas is a 41 year old who is being evaluated via a billable video visit.      How would you like to obtain your AVS? MyChart  If the video visit is dropped, the invitation should be resent by: Text to cell phone: [phone]  Will anyone else be joining your video visit? No       OLGA Woodson    Depression Response    Patient completed the PHQ-9 assessment for depression and scored >9? Yes  Question 9 on the PHQ-9 was positive for suicidality? Yes  Does patient have current mental health provider? Yes    Is this a virtual visit? Yes            Video Start Time: 1102  Video-Visit Details    Type of service:  Video Visit    Video End Time:1143    Originating Location (pt. Location): Home    Distant Location (provider location):  [facility]     Platform used for Video Visit: [employer]
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
## 2022-06-13 PROBLEM — F12.10 CANNABIS USE DISORDER, MILD, ABUSE: Status: ACTIVE | Noted: 2022-06-13

## 2022-10-14 ENCOUNTER — HOSPITAL ENCOUNTER (EMERGENCY)
Facility: CLINIC | Age: 42
Discharge: LEFT BEFORE DISCHARGE | End: 2022-10-14
Payer: MEDICARE

## 2022-10-14 NOTE — ED PROVIDER NOTES
[facility] EMERGENCY DEPARTMENT ([facility])  10/14/22      History   No chief complaint on file.    HPI  Srinivas Young is a 41 year old male who has a history of obstructive sleep apnea, depression, SEVEN, alcohol dependence, suicidal ideation, and cannabis use disorder presents to the Emergency Department with ***      Past Medical History  Past Medical History:   Diagnosis Date     Chronic migraine w/o aura w/o status migrainosus, not intractable      Depression      Depressive disorder      Fatty liver      Prolonged Q-T interval on ECG      Past Surgical History:   Procedure Laterality Date     ENDOSCOPY DRUG INDUCED SLEEP N/A 8/25/2021    Procedure: DRUG-INDUCED SLEEP ENDOSCOPY;  Surgeon: Dolly Dominguez MD;  Location: [facility] OR     no surgical history       ARIPiprazole (ABILIFY) 10 MG tablet  dexmethylphenidate (FOCALIN XR) 15 MG 24 hr capsule  EMGALITY 120 MG/ML injection  ergocalciferol (ERGOCALCIFEROL) 1.25 MG (43751 UT) capsule  magnesium 250 MG tablet  nortriptyline (PAMELOR) 25 MG capsule  omeprazole (PRILOSEC) 40 MG DR capsule  riboflavin (VITAMIN  B2) 25 MG TABS  rosuvastatin (CRESTOR) 5 MG tablet  SUMAtriptan (IMITREX) 100 MG tablet  verapamil ER (VERELAN) 120 MG 24 hr capsule  vortioxetine (TRINTELLIX) 20 MG tablet      No Known Allergies  Family History  Family History   Problem Relation Age of Onset     Depression Mother      Depression Father      Depression Paternal Grandmother      Arrhythmia Brother         prolonged QT     Arrhythmia Sister         prolonged QT     Social History   Social History     Tobacco Use     Smoking status: Never     Smokeless tobacco: Never   Substance Use Topics     Alcohol use: Yes     Comment: daily- 1 or 2      Drug use: Yes     Types: Marijuana      Past medical history, past surgical history, medications, allergies, family history, and social history were reviewed with the patient. No additional pertinent items.       Review of Systems  A  complete review of systems was performed with pertinent positives and negatives noted in the HPI, and all other systems negative.    Physical Exam      Physical Exam  ***  ED Course      Procedures       {ED Course Selections:395946}              No results found for any visits on 10/14/22.  Medications - No data to display     Assessments & Plan (with Medical Decision Making)   ***    I have reviewed the nursing notes. I have reviewed the findings, diagnosis, plan and need for follow up with the patient.    New Prescriptions    No medications on file       Final diagnoses:   None       --  ***  [facility] EMERGENCY DEPARTMENT  10/14/2022

## 2022-10-14 NOTE — ED NOTES
Called for triage.  Not in ED lobby.  Registration staff states individual left.  Dismissed as left without being seen before triage.

## 2022-10-23 ENCOUNTER — HEALTH MAINTENANCE LETTER (OUTPATIENT)
Age: 42
End: 2022-10-23

## 2023-11-11 ENCOUNTER — HEALTH MAINTENANCE LETTER (OUTPATIENT)
Age: 43
End: 2023-11-11

## 2024-05-05 NOTE — ED NOTES
Patient told writer that he normally use CPAP when sleeping, he told writer that he is gasping without the CPAP. Writer called respiratory to bring CPAP for the patient but no CPAP machine available at the moment. Writer informed patient, to have someone bring his CPAP. Pt not in any distress.   1 or 2

## 2024-12-28 ENCOUNTER — HEALTH MAINTENANCE LETTER (OUTPATIENT)
Age: 44
End: 2024-12-28

## 2025-07-10 NOTE — ED NOTES
Emergency Department Patient Sign-out       Brief HPI:  This is a 41 year old male signed out to me by Dr. Bowling .  See initial ED Provider note for details of the presentation.            Significant Events prior to my assuming care: Patient presented to the emergency department with suicidal ideations.  He is awaiting mental health admission.      Exam:   Patient Vitals for the past 24 hrs:   BP Temp Temp src Pulse Resp SpO2 Weight   12/26/21 0055 (!) 131/93 -- -- 67 18 97 % --   12/25/21 1943 (!) 144/95 -- -- 72 16 96 % --   12/25/21 1038 -- -- -- -- -- -- 88.5 kg (195 lb)   12/25/21 1031 (!) 138/95 97.5  F (36.4  C) Oral 74 16 98 % --           ED RESULTS:   Results for orders placed or performed during the hospital encounter of 12/25/21 (from the past 24 hour(s))   Drug abuse screen 6 urine (chem dep) ([facility])     Status: Abnormal    Collection Time: 12/25/21 10:50 AM   Result Value Ref Range    Amphetamines Urine Screen Negative Screen Negative    Barbiturates Urine Screen Negative Screen Negative    Benzodiazepines Urine Screen Negative Screen Negative    Cannabinoids Urine Screen Positive (A) Screen Negative    Cocaine Urine Screen Negative Screen Negative    Ethanol Urine Screen Negative Screen Negative    Opiates Urine Screen Negative Screen Negative   Asymptomatic COVID-19 Virus (Coronavirus) by PCR Nasopharyngeal     Status: Normal    Collection Time: 12/25/21 12:16 PM    Specimen: Nasopharyngeal; Swab   Result Value Ref Range    SARS CoV2 PCR Negative Negative    Narrative    Testing was performed using the patricia  SARS-CoV-2 & Influenza A/B Assay on the patricia  [place]  System.  This test should be ordered for the detection of SARS-COV-2 in individuals who meet SARS-CoV-2 clinical and/or epidemiological criteria. Test performance is unknown in asymptomatic patients.  This test is for in vitro diagnostic use under the FDA EUA for laboratories certified under CLIA to perform moderate and/or high  "complexity testing. This test has not been FDA cleared or approved.  A negative test does not rule out the presence of PCR inhibitors in the specimen or target RNA in concentration below the limit of detection for the assay. The possibility of a false negative should be considered if the patient's recent exposure or clinical presentation suggests COVID-19.  [facility] are certified under the Clinical Laboratory Improvement Amendments of 1988 (CLIA-88) as qualified to perform moderate and/or high complexity laboratory testing.       ED MEDICATIONS:   Medications   SUMAtriptan (IMITREX) tablet 100 mg (100 mg Oral Given 12/25/21 1944)   lidocaine (viscous) (XYLOCAINE) 2 % 15 mL, alum & mag hydroxide-simethicone (MAALOX) 15 mL GI Cocktail (30 mLs Oral Given 12/26/21 0120)     0100-patient complained of central burning, \"on fire\" chest pain for the past 2 or 3 hours.  EKG obtained and reveals sinus bradycardia without ischemic change.  GI cocktail given with resolution of symptoms.  He has not taken his omeprazole in months.  Suspect GERD.         EKG Interpretation:      Interpreted by SONDRA BARCLAY MD, MD  Time reviewed:0100   Symptoms at time of EKG: chest pain   Rhythm: sinus bradycardia  Rate: normal  Axis: NORMAL  Ectopy: none  Conduction: normal  ST Segments/ T Waves: No ST-T wave changes  Q Waves: none  Comparison to prior: No old EKG available    Clinical Impression: normal EKG    Impression:    ICD-10-CM    1. Suicidal ideation  R45.851        Plan:    Proceed with mental health admission.  To be transferred to [facility] in the morning..        MD Barbara RUSSELL MD, Paul, MD  12/26/21 6719    " Pt transferred form [place] for difficulty breathing. as per mother pt started having difficulty breathing yesterday, denies fever. 3 neb treatments and dex given at OSH. 3 neb treatments given by EMS. pt with retractions, belly breathing and wheezing b/l on arrival. awake, alert, appropriate during triage. MD at bedside.

## (undated) DEVICE — SPONGE COTTONOID 1/2X2" 20-32S

## (undated) DEVICE — LINEN TOWEL PACK X5 5464

## (undated) DEVICE — LABEL MEDICATION SYSTEM 3303-P

## (undated) DEVICE — ANTIFOG SOLUTION W/FOAM PAD 31142527

## (undated) DEVICE — JELLY LUBRICATING SURGILUBE 2OZ TUBE

## (undated) RX ORDER — BUPIVACAINE HYDROCHLORIDE 2.5 MG/ML
INJECTION, SOLUTION EPIDURAL; INFILTRATION; INTRACAUDAL
Status: DISPENSED
Start: 2021-08-25

## (undated) RX ORDER — OXYMETAZOLINE HYDROCHLORIDE 0.05 G/100ML
SPRAY NASAL
Status: DISPENSED
Start: 2021-08-25

## (undated) RX ORDER — BUPIVACAINE HYDROCHLORIDE 5 MG/ML
INJECTION, SOLUTION EPIDURAL; INTRACAUDAL
Status: DISPENSED
Start: 2021-08-25

## (undated) RX ORDER — LIDOCAINE HYDROCHLORIDE 40 MG/ML
SOLUTION TOPICAL
Status: DISPENSED
Start: 2021-08-25

## (undated) RX ORDER — GLYCOPYRROLATE 0.2 MG/ML
INJECTION, SOLUTION INTRAMUSCULAR; INTRAVENOUS
Status: DISPENSED
Start: 2021-08-25

## (undated) RX ORDER — LIDOCAINE HYDROCHLORIDE 20 MG/ML
INJECTION, SOLUTION EPIDURAL; INFILTRATION; INTRACAUDAL; PERINEURAL
Status: DISPENSED
Start: 2021-08-25

## (undated) RX ORDER — BUPIVACAINE HYDROCHLORIDE AND EPINEPHRINE 5; 5 MG/ML; UG/ML
INJECTION, SOLUTION EPIDURAL; INTRACAUDAL; PERINEURAL
Status: DISPENSED
Start: 2021-08-25